# Patient Record
Sex: MALE | Race: WHITE | NOT HISPANIC OR LATINO | Employment: OTHER | URBAN - METROPOLITAN AREA
[De-identification: names, ages, dates, MRNs, and addresses within clinical notes are randomized per-mention and may not be internally consistent; named-entity substitution may affect disease eponyms.]

---

## 2023-01-10 ENCOUNTER — OFFICE VISIT (OUTPATIENT)
Dept: CARDIOLOGY | Facility: CLINIC | Age: 73
End: 2023-01-10
Payer: MEDICARE

## 2023-01-10 VITALS
SYSTOLIC BLOOD PRESSURE: 162 MMHG | HEART RATE: 67 BPM | BODY MASS INDEX: 30.35 KG/M2 | HEIGHT: 70 IN | OXYGEN SATURATION: 97 % | DIASTOLIC BLOOD PRESSURE: 70 MMHG | WEIGHT: 212 LBS

## 2023-01-10 DIAGNOSIS — I48.0 PAF (PAROXYSMAL ATRIAL FIBRILLATION): Primary | ICD-10-CM

## 2023-01-10 PROCEDURE — 99204 OFFICE O/P NEW MOD 45 MIN: CPT | Performed by: STUDENT IN AN ORGANIZED HEALTH CARE EDUCATION/TRAINING PROGRAM

## 2023-01-10 PROCEDURE — 93000 ELECTROCARDIOGRAM COMPLETE: CPT | Performed by: STUDENT IN AN ORGANIZED HEALTH CARE EDUCATION/TRAINING PROGRAM

## 2023-01-10 RX ORDER — ALBUTEROL SULFATE 90 UG/1
1 AEROSOL, METERED RESPIRATORY (INHALATION) AS NEEDED
COMMUNITY
Start: 2022-01-26 | End: 2023-01-26

## 2023-01-10 RX ORDER — METOPROLOL SUCCINATE 25 MG/1
25 TABLET, EXTENDED RELEASE ORAL DAILY
COMMUNITY
Start: 2022-12-27 | End: 2023-02-06

## 2023-01-10 RX ORDER — MONTELUKAST SODIUM 10 MG/1
10 TABLET ORAL DAILY
COMMUNITY
Start: 2023-01-05

## 2023-01-10 RX ORDER — ASPIRIN 81 MG/1
81 TABLET, CHEWABLE ORAL DAILY
COMMUNITY

## 2023-01-10 RX ORDER — TIOTROPIUM BROMIDE INHALATION SPRAY 1.56 UG/1
2 SPRAY, METERED RESPIRATORY (INHALATION) DAILY
COMMUNITY
Start: 2023-01-05

## 2023-01-10 RX ORDER — TAMSULOSIN HYDROCHLORIDE 0.4 MG/1
1 CAPSULE ORAL
COMMUNITY
Start: 2022-10-20

## 2023-01-10 RX ORDER — CETIRIZINE HYDROCHLORIDE 10 MG/1
10 CAPSULE, LIQUID FILLED ORAL DAILY
COMMUNITY

## 2023-01-10 RX ORDER — BUDESONIDE AND FORMOTEROL FUMARATE DIHYDRATE 160; 4.5 UG/1; UG/1
2 AEROSOL RESPIRATORY (INHALATION) 2 TIMES DAILY
COMMUNITY
Start: 2022-10-19

## 2023-01-10 RX ORDER — ACETAMINOPHEN 500 MG
500 TABLET ORAL AS NEEDED
COMMUNITY

## 2023-01-10 RX ORDER — APIXABAN 5 MG/1
5 TABLET, FILM COATED ORAL 2 TIMES DAILY
COMMUNITY
Start: 2022-12-14 | End: 2023-03-29 | Stop reason: SDUPTHER

## 2023-01-10 RX ORDER — MAGNESIUM OXIDE TAB 400 MG (241.3 MG ELEMENTAL MG) 400 (241.3 MG) MG
1 TAB ORAL DAILY
COMMUNITY
Start: 2022-11-18

## 2023-01-10 RX ORDER — LANOLIN ALCOHOL/MO/W.PET/CERES
1000 CREAM (GRAM) TOPICAL DAILY
COMMUNITY

## 2023-01-10 RX ORDER — OMEPRAZOLE 40 MG/1
40 CAPSULE, DELAYED RELEASE ORAL TAKE AS DIRECTED
COMMUNITY
End: 2023-03-02 | Stop reason: HOSPADM

## 2023-01-10 RX ORDER — LORATADINE 10 MG/1
10 CAPSULE, LIQUID FILLED ORAL DAILY
COMMUNITY
End: 2023-03-02 | Stop reason: HOSPADM

## 2023-01-10 RX ORDER — ROSUVASTATIN CALCIUM 40 MG/1
40 TABLET, COATED ORAL DAILY
COMMUNITY
Start: 2022-12-26 | End: 2023-03-23

## 2023-01-10 NOTE — PROGRESS NOTES
[facility] at [facility]  INITIAL OFFICE CONSULT      Albino Rivera  1950  PCP: Linsey Jung MD    Referring MD:Aracelis Prince MD    SUBJECTIVE:   Albino Rivera is a 72 y.o. male seen for a consultation visit regarding the following:     Chief Complaint:   Chief Complaint   Patient presents with   • Paroxysmal Atrial Fibrillation          Consultation is requested by Aracelis Prince MD for evaluation of Paroxysmal Atrial Fibrillation        History:  72-year-old gentleman presents today to the electrophysiology clinic under the request of Dr. Prince for evaluation regarding atrial fibrillation.  He says he had atrial fibrillation dating back at least 2 years.  He is required multiple cardioversions for this.  He was initially placed on sotalol but did have some potential side effects with this.  In retrospect he thinks these were likely unrelated to the sotalol as he has had similar issues after stopping the sotalol.  When he has atrial fibrillation he feels his heart race and overall feels unwell.  This is hard currently happening every few months.  He denies any chest pain, dyspnea, orthopnea, PND, or lower extremity swelling.      Cardiac PMH: (Old records have been reviewed and summarized below)  1. PAF  a. Atrial fibrillation/flutter diagnosed on Holter monitor for 6 days revealing episodes of atrial tachycardia/flutter January 2021  b. Atrial fibrillation with RVR requiring cardioversion procedure June 2022 outside hospital in [place].  c. Symptomatic with palpitations  d. Chads Vascor equal to 3 chronic Eliquis therapy  e. Sotalol therapy ordered per local cardiologist November 2022  2. Coronary disease  a. Angina pectoris abnormal stress echoc small codominant ardiogram with echo revealing apical septal hypokinesis 2021  b. Left heart catheterization and LAD stent, 40% circumflex artery stenosis, with noted  of small codominant RCA February 2021 [facility]  c. Echocardiogram December 30, 2021 EF 45 to 50% mild LVH, left atrium is mildly dilated moderate AI mild MR  d.   3. COPD  4. Hyperlipidemia  5. Abdominal aortic aneurysm: Followed by Dr. Prince      Past Medical History, Past Surgical History, Family history, Social History, and Medications were all reviewed with the patient today and updated as necessary.     No current outpatient medications on file.     No current facility-administered medications for this visit.     Allergies   Allergen Reactions   • Ace Inhibitors Cough         No past medical history on file.  No past surgical history on file.  No family history on file.  Social History     Tobacco Use   • Smoking status: Not on file   • Smokeless tobacco: Not on file   Substance Use Topics   • Alcohol use: Not on file       ROS:  Review of Symptoms:  General: no recent weight loss/gain, weakness or fatigue  Skin: no rashes, lumps, or other skin changes  HEENT: no dizziness, lightheadedness, or vision changes  Respiratory COPD, inhaler use  Cardiovascular: + palpitations, and tachycardia  Gastrointestinal: no black/tarry stools or diarrhea  Urinary: no change in frequency or urgency  Peripheral Vascular: no claudication or leg cramps  Musculoskeletal: no muscle or joint pain/stiffness  Psychiatric: no depression or excessive stress  Neurological: no sensory or motor loss, no syncope  Hematologic: no anemia, easy bruising or bleeding  Endocrine: no thyroid problems, nor heat or cold intolerance         PHYSICAL EXAM:   There were no vitals taken for this visit.     Wt Readings from Last 5 Encounters:   No data found for Wt     BP Readings from Last 5 Encounters:   No data found for BP       General-Well Nourished, Well developed  Eyes - PERRLA  Neck- supple, No mass  CV- regular rate and rhythm, no MRG  Lung- clear bilaterally  Abd- soft, +BS  Musc/skel - Norm strength and range of motion  Skin- warm and dry  Neuro - Alert & Oriented x 3,  appropriate mood.    Patient's external notes were reviewed.  Independent interpretation of test performed by another physician in facility were reviewed.  Outside laboratory data was also reviewed.    Medical problems and test results were reviewed with the patient today.     No results found for this or any previous visit.      Lab Results   Component Value Date    HDL 65 01/27/2022    LDL 70.8 01/27/2022       EKG:  (EKG/Tracing has been independently visualized by me and summarized below)      ECG 12 Lead    Date/Time: 1/10/2023 10:26 AM  Performed by: Manjeet Paniagua MD  Authorized by: Manjeet Paniagua MD   Previous ECG: no previous ECG available  Comments: Sinus rhythm, left axis deviation, nonspecific IVCD, PVCs          Advance Care Planning   ACP discussion was held with the patient during this visit. Patient does not have an advance directive, information provided.         ASSESSMENT/Plan  1. PAF: Recent symptomatic A. fib with RVR, initiation of sotalol therapy per local cardiologist.  Sotalol has since been stopped due to potential side effects.  We discussed today about options going forward.  We discussed the options of either antiarrhythmic agents or catheter ablation.  For the latter we discussed how the procedure performed, the likely of success and potential risks.  He is very interested in proceeding with this.  We will work on getting this scheduled.  2. CAD: Angina abnormal stress test, left heart catheterization with LAD stent noted 40% circumflex artery stenosis and  of small nondominant RCA stenosis EF 45 to 50%.  No anginal symptoms currently, will monitor  3. ICM /valvular heart disease EF 45-50%, mild left atrium dilation mild LVH moderate AI by Echocardiogram December 30, 2021.  Will defer repeat echocardiogram to his primary cardiologist  4. HLD: Statin therapy  5.  [id number] score equal to 4, Eliquis  5 mg twice daily  6.  PVCs he did have PVCs on his EKG today.  I cannot see all 12  leads of this, but it does appear to have a leftward axis as well as discordance in lead II and III.  V1 is negative.  This could represent a PVC from some around the tricuspid annulus.  We may be able to address this during the ablation procedure if these are frequent.         Cardiology/Electrophysiology  01/10/23  09:03 EST

## 2023-01-18 ENCOUNTER — PREP FOR SURGERY (OUTPATIENT)
Dept: OTHER | Facility: HOSPITAL | Age: 73
End: 2023-01-18
Payer: MEDICARE

## 2023-01-18 DIAGNOSIS — I48.0 PAF (PAROXYSMAL ATRIAL FIBRILLATION): Primary | ICD-10-CM

## 2023-01-18 RX ORDER — SODIUM CHLORIDE 9 MG/ML
40 INJECTION, SOLUTION INTRAVENOUS AS NEEDED
Status: CANCELLED | OUTPATIENT
Start: 2023-01-18

## 2023-01-18 RX ORDER — ACETAMINOPHEN 325 MG/1
650 TABLET ORAL EVERY 4 HOURS PRN
Status: CANCELLED | OUTPATIENT
Start: 2023-01-18

## 2023-01-18 RX ORDER — ONDANSETRON 2 MG/ML
4 INJECTION INTRAMUSCULAR; INTRAVENOUS EVERY 6 HOURS PRN
Status: CANCELLED | OUTPATIENT
Start: 2023-01-18

## 2023-01-18 RX ORDER — NITROGLYCERIN 0.4 MG/1
0.4 TABLET SUBLINGUAL
Status: CANCELLED | OUTPATIENT
Start: 2023-01-18

## 2023-02-06 ENCOUNTER — PRE-ADMISSION TESTING (OUTPATIENT)
Dept: PREADMISSION TESTING | Facility: HOSPITAL | Age: 73
End: 2023-02-06
Payer: MEDICARE

## 2023-02-06 ENCOUNTER — HOSPITAL ENCOUNTER (OUTPATIENT)
Dept: CT IMAGING | Facility: HOSPITAL | Age: 73
Discharge: HOME | End: 2023-02-06
Payer: MEDICARE

## 2023-02-06 VITALS — HEIGHT: 70 IN | BODY MASS INDEX: 30.47 KG/M2 | WEIGHT: 212.85 LBS

## 2023-02-06 DIAGNOSIS — I48.0 PAF (PAROXYSMAL ATRIAL FIBRILLATION): ICD-10-CM

## 2023-02-06 LAB
ANION GAP SERPL CALCULATED.3IONS-SCNC: 8 MMOL/L (ref 5–15)
BUN SERPL-MCNC: 15 MG/DL (ref 8–23)
BUN/CREAT SERPL: 16 (ref 7–25)
CALCIUM SPEC-SCNC: 9 MG/DL (ref 8.6–10.5)
CHLORIDE SERPL-SCNC: 103 MMOL/L (ref 98–107)
CO2 SERPL-SCNC: 28 MMOL/L (ref 22–29)
CREAT SERPL-MCNC: 0.94 MG/DL (ref 0.76–1.27)
DEPRECATED RDW RBC AUTO: 44.3 FL (ref 37–54)
EGFRCR SERPLBLD CKD-EPI 2021: 85.6 ML/MIN/1.73
ERYTHROCYTE [DISTWIDTH] IN BLOOD BY AUTOMATED COUNT: 12.9 % (ref 12.3–15.4)
GLUCOSE SERPL-MCNC: 107 MG/DL (ref 65–99)
HCT VFR BLD AUTO: 43.9 % (ref 37.5–51)
HGB BLD-MCNC: 14.9 G/DL (ref 13–17.7)
MAGNESIUM SERPL-MCNC: 1.8 MG/DL (ref 1.6–2.4)
MCH RBC QN AUTO: 31.7 PG (ref 26.6–33)
MCHC RBC AUTO-ENTMCNC: 33.9 G/DL (ref 31.5–35.7)
MCV RBC AUTO: 93.4 FL (ref 79–97)
PLATELET # BLD AUTO: 219 10*3/MM3 (ref 140–450)
PMV BLD AUTO: 10 FL (ref 6–12)
POTASSIUM SERPL-SCNC: 4.7 MMOL/L (ref 3.5–5.2)
RBC # BLD AUTO: 4.7 10*6/MM3 (ref 4.14–5.8)
SODIUM SERPL-SCNC: 139 MMOL/L (ref 136–145)
WBC NRBC COR # BLD: 6.08 10*3/MM3 (ref 3.4–10.8)

## 2023-02-06 PROCEDURE — 71275 CT ANGIOGRAPHY CHEST: CPT

## 2023-02-06 PROCEDURE — 85027 COMPLETE CBC AUTOMATED: CPT

## 2023-02-06 PROCEDURE — 83735 ASSAY OF MAGNESIUM: CPT

## 2023-02-06 PROCEDURE — 80048 BASIC METABOLIC PNL TOTAL CA: CPT

## 2023-02-06 PROCEDURE — 0 IOPAMIDOL PER 1 ML: Performed by: STUDENT IN AN ORGANIZED HEALTH CARE EDUCATION/TRAINING PROGRAM

## 2023-02-06 PROCEDURE — 36415 COLL VENOUS BLD VENIPUNCTURE: CPT

## 2023-02-06 RX ORDER — METOPROLOL TARTRATE 50 MG/1
50 TABLET, FILM COATED ORAL 2 TIMES DAILY
COMMUNITY
End: 2023-03-02 | Stop reason: HOSPADM

## 2023-02-06 RX ADMIN — IOPAMIDOL 80 ML: 755 INJECTION, SOLUTION INTRAVENOUS at 13:55

## 2023-02-06 NOTE — DISCHARGE INSTRUCTIONS
"Dear Patient,    Do NOT eat, drink, or smoke after midnight the night before your procedure.   Take your medications as instructed by your doctor.    Glasses and jewelry may be worn, but dentures must be removed prior to your procedure.    Leave any items you consider valuable at home.      MORNING of your Procedure, please bring the following:     -Photo ID and insurance card(s)    -ALL medications in their ORIGINAL CONTAINERS    -Co-pay and/or deductible required by your insurance   -Copy of living will or power of  document (if not brought to    Pre-Admission Testing department)   -CPAP mask and tubing, not your machine (if applicable)    -Relaxation aids (music, books, magazines)   -Skin Prep Instruction Sheet (if applicable)   -Relaxation Aids    Check in on the 2nd floor in the [facility].  Your procedure will be performed in the cath lab or EP lab.  During your procedure, your family will wait in the cath lab waiting area where you checked in.      Need to make arrangements for transportation prior to discharge.    A handout regarding \"Heart Healthy Eating\" was provided today to encourage healthy eating habits.    Booklet published by [employer] was given in Pre-Admission testing.  This booklet is for informational purposes only.  If you have any questions about your procedure, please speak with your physician.      Please note:  If you are scheduled to have one of the following procedures: Pulmonary Vein Ablation, Lead Extraction, MitraClip, Cerebral Coilings or Embolization, please let your family know that after your procedure you will be going to recovery unit on the 2nd floor of the [facility].  When the physician is finished speaking with your family after your procedure is completed, your family will be directed or escorted to the surgery waiting area in the [facility].  This is where your family will wait until you are given a room assignment and then your family will be directed to the " appropriate unit.

## 2023-02-09 NOTE — NURSING NOTE
PRE-PVA ASSESSMENT  Albino Rivera 1950   [address] [address]   [phone]    Referral Source: Aracelis Prince MD  Information obtained from: [x] Medical record review  [x] Patient report  Scheduled for: PVA on 2/13/23 with Dr. Paniagua  Allergies   Allergen Reactions   • Ace Inhibitors Cough       AFib Specific History:  AFIBTYPE2: paroxysmal    CHADS-VASc Risk Assessment            2 Total Score    1 Hypertension    1 Age 65-74        Criteria that do not apply:    CHF    Age >/= 75    DM    PRIOR STROKE/TIA/THROMBO    Vascular Disease    Sex: Female        Anticoagulation: Eliquis 5 mg bid NO MISSED DOSES   Cardioversion x multiple  Failed AAD(s): sotalol  Prior Ablation: No    Is Mr. Rivera aware of his AFib? Yes   Onset: 2021   Exacerbations: none   Frequency: daily- weekly  Alleviations: none     Duration: up to 4 hours      Symptoms:   [x] Palpitations:    [] Chest Discomfort:    [] Dizziness:    [] Presyncope:    [] Lightheadedness:   [] Syncope:    [x] Fatigue:    [] Other:    [x] Short of Breath:     Last Echo(s):  [x] Echocardiogram December 30, 2021 EF 45 to 50% mild LVH, left atrium is mildly dilated moderate AI mild MR       Past medical History:   [] Diabetes   No               Hemoglobin A1C   Date Value Ref Range Status   01/30/2023 5.4 <5.7 % Final   01/27/2022 5.6 <5.7 % Final        [] HYPOthyroidism No  [] HYPERthyroidism No       No results found for: TSH    [x] HTN        [] Tx metoprolol         [] Controlled SBP ~ 135    [] Heart Failure  No  [] CVA   No                            [] TIA No        [] Ischemic         [] Hemorrhagic         [] Nonischemic         [] Embolic        [] Diastolic    [x] CAD         [] MI No           [x] Dyslipidemia  [x] Statin indicated on crestor    [x] Ischemic Evaluation  Angina pectoris abnormal stress echoc small codominant ardiogram with echo revealing apical septal hypokinesis 2021    Left heart catheterization and LAD stent, 40%  circumflex artery stenosis, with noted  of small codominant RCA February 2021 [facility]    [x] Sleep Apnea Diagnosed - just diagnosed with mod.  Appointment to follow on 3/8    [x] Obesity       [x] BMI 30.42        [x] Weight reduction discussed with Mr. Rivera         [] Nutrition Consult            [x] Declined by Mr. Rivera     [] Depression  No   [] Anxiety No                  [] Urologic History H/O kidney stone        [] Urologic cancer surgery         [] Severe decrease in flow of urine stream        [] Recent unexplained gross hematuria       [] Hx urethral stricture     Other Pertinent PMH: COPD, Abdominal aortic aneurysm: Followed by Dr. Prince    Social / Lifestyle History:   []   Tobacco:Denies    [x]   Alcohol:1 drink the week     [x]   Caffeine: 2 cups per day   []   Recreational Drugs: None    []   Stress Issues: No    [x]   Exercise: walking    Summary of Patient Contact:    I spoke with Mr. Rivera about his upcoming PVA.   He was well informed about the procedure from prior discussion with Dr. Paniagua and from reading the provided literature.  We discussed the procedure at length including risks, anesthesia, intra-op procedures, recovery, bedrest, sheath removal, discharge criteria, normal post-procedure expectations, and success rates.  I answered a few remaining questions. Mr. Rivera verbalized understanding and he is ready to proceed.      Brittany Hector RN

## 2023-02-13 ENCOUNTER — HOSPITAL ENCOUNTER (OUTPATIENT)
Facility: HOSPITAL | Age: 73
Setting detail: HOSPITAL OUTPATIENT SURGERY
Discharge: HOME | End: 2023-02-13
Attending: STUDENT IN AN ORGANIZED HEALTH CARE EDUCATION/TRAINING PROGRAM | Admitting: STUDENT IN AN ORGANIZED HEALTH CARE EDUCATION/TRAINING PROGRAM
Payer: MEDICARE

## 2023-02-13 ENCOUNTER — ANESTHESIA (OUTPATIENT)
Dept: CARDIOLOGY | Facility: HOSPITAL | Age: 73
End: 2023-02-13
Payer: MEDICARE

## 2023-02-13 ENCOUNTER — ANESTHESIA EVENT (OUTPATIENT)
Dept: CARDIOLOGY | Facility: HOSPITAL | Age: 73
End: 2023-02-13
Payer: MEDICARE

## 2023-02-13 VITALS
RESPIRATION RATE: 20 BRPM | WEIGHT: 211.2 LBS | SYSTOLIC BLOOD PRESSURE: 143 MMHG | HEIGHT: 70 IN | TEMPERATURE: 98.6 F | DIASTOLIC BLOOD PRESSURE: 78 MMHG | BODY MASS INDEX: 30.24 KG/M2 | HEART RATE: 69 BPM | OXYGEN SATURATION: 96 %

## 2023-02-13 DIAGNOSIS — I48.0 PAF (PAROXYSMAL ATRIAL FIBRILLATION): ICD-10-CM

## 2023-02-13 LAB
ACT BLD: 305 SECONDS (ref 82–152)
ACT BLD: 317 SECONDS (ref 82–152)
ACT BLD: 329 SECONDS (ref 82–152)
ACT BLD: 335 SECONDS (ref 82–152)
ACT BLD: 347 SECONDS (ref 82–152)

## 2023-02-13 PROCEDURE — 0 IOPAMIDOL PER 1 ML: Performed by: STUDENT IN AN ORGANIZED HEALTH CARE EDUCATION/TRAINING PROGRAM

## 2023-02-13 PROCEDURE — C1894 INTRO/SHEATH, NON-LASER: HCPCS | Performed by: STUDENT IN AN ORGANIZED HEALTH CARE EDUCATION/TRAINING PROGRAM

## 2023-02-13 PROCEDURE — C1759 CATH, INTRA ECHOCARDIOGRAPHY: HCPCS | Performed by: STUDENT IN AN ORGANIZED HEALTH CARE EDUCATION/TRAINING PROGRAM

## 2023-02-13 PROCEDURE — 93657 TX L/R ATRIAL FIB ADDL: CPT | Performed by: STUDENT IN AN ORGANIZED HEALTH CARE EDUCATION/TRAINING PROGRAM

## 2023-02-13 PROCEDURE — 25010000002 PROTAMINE SULFATE PER 10 MG: Performed by: STUDENT IN AN ORGANIZED HEALTH CARE EDUCATION/TRAINING PROGRAM

## 2023-02-13 PROCEDURE — C1732 CATH, EP, DIAG/ABL, 3D/VECT: HCPCS | Performed by: STUDENT IN AN ORGANIZED HEALTH CARE EDUCATION/TRAINING PROGRAM

## 2023-02-13 PROCEDURE — 25010000002 ONDANSETRON PER 1 MG: Performed by: NURSE ANESTHETIST, CERTIFIED REGISTERED

## 2023-02-13 PROCEDURE — 93623 PRGRMD STIMJ&PACG IV RX NFS: CPT | Performed by: STUDENT IN AN ORGANIZED HEALTH CARE EDUCATION/TRAINING PROGRAM

## 2023-02-13 PROCEDURE — C1893 INTRO/SHEATH, FIXED,NON-PEEL: HCPCS | Performed by: STUDENT IN AN ORGANIZED HEALTH CARE EDUCATION/TRAINING PROGRAM

## 2023-02-13 PROCEDURE — 85347 COAGULATION TIME ACTIVATED: CPT

## 2023-02-13 PROCEDURE — C1769 GUIDE WIRE: HCPCS | Performed by: STUDENT IN AN ORGANIZED HEALTH CARE EDUCATION/TRAINING PROGRAM

## 2023-02-13 PROCEDURE — 25010000002 FENTANYL CITRATE (PF) 50 MCG/ML SOLUTION: Performed by: NURSE ANESTHETIST, CERTIFIED REGISTERED

## 2023-02-13 PROCEDURE — 93656 COMPRE EP EVAL ABLTJ ATR FIB: CPT | Performed by: STUDENT IN AN ORGANIZED HEALTH CARE EDUCATION/TRAINING PROGRAM

## 2023-02-13 PROCEDURE — 25010000002 PHENYLEPHRINE 10 MG/ML SOLUTION 1 ML VIAL: Performed by: NURSE ANESTHETIST, CERTIFIED REGISTERED

## 2023-02-13 PROCEDURE — 25010000002 HEPARIN (PORCINE) PER 1000 UNITS: Performed by: STUDENT IN AN ORGANIZED HEALTH CARE EDUCATION/TRAINING PROGRAM

## 2023-02-13 PROCEDURE — 25010000002 PROPOFOL 10 MG/ML EMULSION: Performed by: NURSE ANESTHETIST, CERTIFIED REGISTERED

## 2023-02-13 PROCEDURE — 25010000002 DEXAMETHASONE PER 1 MG: Performed by: NURSE ANESTHETIST, CERTIFIED REGISTERED

## 2023-02-13 PROCEDURE — C1760 CLOSURE DEV, VASC: HCPCS | Performed by: STUDENT IN AN ORGANIZED HEALTH CARE EDUCATION/TRAINING PROGRAM

## 2023-02-13 PROCEDURE — 93655 ICAR CATH ABLTJ DSCRT ARRHYT: CPT | Performed by: STUDENT IN AN ORGANIZED HEALTH CARE EDUCATION/TRAINING PROGRAM

## 2023-02-13 PROCEDURE — C1766 INTRO/SHEATH,STRBLE,NON-PEEL: HCPCS | Performed by: STUDENT IN AN ORGANIZED HEALTH CARE EDUCATION/TRAINING PROGRAM

## 2023-02-13 PROCEDURE — C1730 CATH, EP, 19 OR FEW ELECT: HCPCS | Performed by: STUDENT IN AN ORGANIZED HEALTH CARE EDUCATION/TRAINING PROGRAM

## 2023-02-13 RX ORDER — PROPOFOL 10 MG/ML
VIAL (ML) INTRAVENOUS AS NEEDED
Status: DISCONTINUED | OUTPATIENT
Start: 2023-02-13 | End: 2023-02-13 | Stop reason: SURG

## 2023-02-13 RX ORDER — ONDANSETRON 2 MG/ML
4 INJECTION INTRAMUSCULAR; INTRAVENOUS ONCE AS NEEDED
Status: DISCONTINUED | OUTPATIENT
Start: 2023-02-13 | End: 2023-02-13 | Stop reason: HOSPADM

## 2023-02-13 RX ORDER — ACETAMINOPHEN 325 MG/1
650 TABLET ORAL EVERY 4 HOURS PRN
Status: DISCONTINUED | OUTPATIENT
Start: 2023-02-13 | End: 2023-02-13 | Stop reason: HOSPADM

## 2023-02-13 RX ORDER — HEPARIN SODIUM 10000 [USP'U]/100ML
INJECTION, SOLUTION INTRAVENOUS
Status: DISCONTINUED | OUTPATIENT
Start: 2023-02-13 | End: 2023-02-13 | Stop reason: HOSPADM

## 2023-02-13 RX ORDER — SODIUM CHLORIDE, SODIUM LACTATE, POTASSIUM CHLORIDE, CALCIUM CHLORIDE 600; 310; 30; 20 MG/100ML; MG/100ML; MG/100ML; MG/100ML
INJECTION, SOLUTION INTRAVENOUS CONTINUOUS PRN
Status: DISCONTINUED | OUTPATIENT
Start: 2023-02-13 | End: 2023-02-13 | Stop reason: SURG

## 2023-02-13 RX ORDER — ONDANSETRON 2 MG/ML
INJECTION INTRAMUSCULAR; INTRAVENOUS AS NEEDED
Status: DISCONTINUED | OUTPATIENT
Start: 2023-02-13 | End: 2023-02-13 | Stop reason: SURG

## 2023-02-13 RX ORDER — PROTAMINE SULFATE 10 MG/ML
INJECTION, SOLUTION INTRAVENOUS
Status: DISCONTINUED | OUTPATIENT
Start: 2023-02-13 | End: 2023-02-13 | Stop reason: HOSPADM

## 2023-02-13 RX ORDER — SODIUM CHLORIDE 9 MG/ML
40 INJECTION, SOLUTION INTRAVENOUS AS NEEDED
Status: DISCONTINUED | OUTPATIENT
Start: 2023-02-13 | End: 2023-02-13 | Stop reason: HOSPADM

## 2023-02-13 RX ORDER — LIDOCAINE HYDROCHLORIDE 10 MG/ML
INJECTION, SOLUTION EPIDURAL; INFILTRATION; INTRACAUDAL; PERINEURAL AS NEEDED
Status: DISCONTINUED | OUTPATIENT
Start: 2023-02-13 | End: 2023-02-13 | Stop reason: SURG

## 2023-02-13 RX ORDER — FENTANYL CITRATE 50 UG/ML
50 INJECTION, SOLUTION INTRAMUSCULAR; INTRAVENOUS
Status: DISCONTINUED | OUTPATIENT
Start: 2023-02-13 | End: 2023-02-13 | Stop reason: HOSPADM

## 2023-02-13 RX ORDER — FENTANYL CITRATE 50 UG/ML
INJECTION, SOLUTION INTRAMUSCULAR; INTRAVENOUS AS NEEDED
Status: DISCONTINUED | OUTPATIENT
Start: 2023-02-13 | End: 2023-02-13 | Stop reason: SURG

## 2023-02-13 RX ORDER — SODIUM CHLORIDE 9 MG/ML
INJECTION, SOLUTION INTRAVENOUS
Status: DISCONTINUED | OUTPATIENT
Start: 2023-02-13 | End: 2023-02-13 | Stop reason: HOSPADM

## 2023-02-13 RX ORDER — SODIUM CHLORIDE 0.9 % (FLUSH) 0.9 %
3 SYRINGE (ML) INJECTION EVERY 12 HOURS SCHEDULED
Status: DISCONTINUED | OUTPATIENT
Start: 2023-02-13 | End: 2023-02-13 | Stop reason: HOSPADM

## 2023-02-13 RX ORDER — DEXAMETHASONE SODIUM PHOSPHATE 10 MG/ML
INJECTION INTRAMUSCULAR; INTRAVENOUS AS NEEDED
Status: DISCONTINUED | OUTPATIENT
Start: 2023-02-13 | End: 2023-02-13 | Stop reason: SURG

## 2023-02-13 RX ORDER — NITROGLYCERIN 0.4 MG/1
0.4 TABLET SUBLINGUAL
Status: DISCONTINUED | OUTPATIENT
Start: 2023-02-13 | End: 2023-02-13 | Stop reason: HOSPADM

## 2023-02-13 RX ORDER — SODIUM CHLORIDE 0.9 % (FLUSH) 0.9 %
10 SYRINGE (ML) INJECTION AS NEEDED
Status: DISCONTINUED | OUTPATIENT
Start: 2023-02-13 | End: 2023-02-13 | Stop reason: HOSPADM

## 2023-02-13 RX ORDER — ACETAMINOPHEN 650 MG/1
650 SUPPOSITORY RECTAL EVERY 4 HOURS PRN
Status: DISCONTINUED | OUTPATIENT
Start: 2023-02-13 | End: 2023-02-13 | Stop reason: HOSPADM

## 2023-02-13 RX ORDER — IBUPROFEN 200 MG
400 TABLET ORAL EVERY 6 HOURS PRN
Status: DISCONTINUED | OUTPATIENT
Start: 2023-02-13 | End: 2023-02-13 | Stop reason: HOSPADM

## 2023-02-13 RX ORDER — HEPARIN SODIUM 1000 [USP'U]/ML
INJECTION, SOLUTION INTRAVENOUS; SUBCUTANEOUS
Status: DISCONTINUED | OUTPATIENT
Start: 2023-02-13 | End: 2023-02-13 | Stop reason: HOSPADM

## 2023-02-13 RX ORDER — ONDANSETRON 2 MG/ML
4 INJECTION INTRAMUSCULAR; INTRAVENOUS EVERY 6 HOURS PRN
Status: DISCONTINUED | OUTPATIENT
Start: 2023-02-13 | End: 2023-02-13 | Stop reason: HOSPADM

## 2023-02-13 RX ORDER — ROCURONIUM BROMIDE 10 MG/ML
INJECTION, SOLUTION INTRAVENOUS AS NEEDED
Status: DISCONTINUED | OUTPATIENT
Start: 2023-02-13 | End: 2023-02-13 | Stop reason: SURG

## 2023-02-13 RX ADMIN — PHENYLEPHRINE HYDROCHLORIDE 0.2 MCG/KG/MIN: 10 INJECTION INTRAVENOUS at 08:57

## 2023-02-13 RX ADMIN — ROCURONIUM BROMIDE 20 MG: 10 INJECTION, SOLUTION INTRAVENOUS at 09:54

## 2023-02-13 RX ADMIN — ROCURONIUM BROMIDE 50 MG: 10 INJECTION, SOLUTION INTRAVENOUS at 08:40

## 2023-02-13 RX ADMIN — ONDANSETRON 4 MG: 2 INJECTION INTRAMUSCULAR; INTRAVENOUS at 12:00

## 2023-02-13 RX ADMIN — SUGAMMADEX 250 MG: 100 INJECTION, SOLUTION INTRAVENOUS at 12:18

## 2023-02-13 RX ADMIN — SODIUM CHLORIDE, POTASSIUM CHLORIDE, SODIUM LACTATE AND CALCIUM CHLORIDE: 600; 310; 30; 20 INJECTION, SOLUTION INTRAVENOUS at 08:30

## 2023-02-13 RX ADMIN — DEXAMETHASONE SODIUM PHOSPHATE 4 MG: 10 INJECTION INTRAMUSCULAR; INTRAVENOUS at 08:49

## 2023-02-13 RX ADMIN — FENTANYL CITRATE 50 MCG: 50 INJECTION, SOLUTION INTRAMUSCULAR; INTRAVENOUS at 08:40

## 2023-02-13 RX ADMIN — PROPOFOL 230 MG: 10 INJECTION, EMULSION INTRAVENOUS at 08:40

## 2023-02-13 RX ADMIN — LIDOCAINE HYDROCHLORIDE 50 MG: 10 INJECTION, SOLUTION EPIDURAL; INFILTRATION; INTRACAUDAL; PERINEURAL at 08:40

## 2023-02-13 RX ADMIN — ROCURONIUM BROMIDE 20 MG: 10 INJECTION, SOLUTION INTRAVENOUS at 10:11

## 2023-02-13 RX ADMIN — ROCURONIUM BROMIDE 10 MG: 10 INJECTION, SOLUTION INTRAVENOUS at 10:26

## 2023-02-13 NOTE — H&P
Cardiology Consult/H&P     Albino Rivera  1950  [phone]  There is no work phone number on file.    02/13/23    DATE OF ADMISSION: 2/13/2023  [facility] EP LAB    Linsey Jung MD  [address] [facility] [address]  Referring Provider: Manjeet Paniagua MD     CC: afib    Problem list:   1. PAF  a. Atrial fibrillation/flutter diagnosed on Holter monitor for 6 days revealing episodes of atrial tachycardia/flutter January 2021  b. Atrial fibrillation with RVR requiring cardioversion procedure June 2022 outside hospital in [place].  c. Symptomatic with palpitations  d. Chads Vascor equal to 3 chronic Eliquis therapy  e. Sotalol therapy ordered per local cardiologist November 2022  2. Coronary disease  a. Angina pectoris abnormal stress echo small codominant ardiogram with echo revealing apical septal hypokinesis 2021  b. Left heart catheterization and LAD stent, 40% circumflex artery stenosis, with noted  of small codominant RCA February 2021 [facility]  c. Echocardiogram December 30, 2021 EF 45 to 50% mild LVH, left atrium is mildly dilated moderate AI mild MR  3. COPD  4. Hyperlipidemia  5. Abdominal aortic aneurysm: Followed by Dr. Prince    History of Present Illness:   Albino Rivera is a 72 year old male with above PMHx presenting for scheduled PVA today. History of afib RVR requiring ECV in June 2022. Tried Sotalol for rhythm control but unable to tolerate. He is symptomatic with palpitations. He has been compliant with Eliquis and last dose was last night.     Allergies   Allergen Reactions   • Ace Inhibitors Cough       Prior to Admission Medications     Prescriptions Last Dose Informant Patient Reported? Taking?    acetaminophen (TYLENOL) 500 MG tablet  Medication Bottle Yes Yes    Take 500 mg by mouth As Needed for Mild Pain.    ALBUTEROL SULFATE HFA IN  Medication Bottle Yes Yes    Inhale As Needed (SOB/wheezing).    aspirin 81 MG chewable tablet 2/13/2023  Medication Bottle Yes Yes    Chew 81 mg Daily.    Cetirizine HCl (ZyrTEC Allergy) 10 MG capsule  Medication Bottle Yes Yes    Take 10 mg by mouth Daily.    Eliquis 5 MG tablet tablet 2/12/2023 Medication Bottle Yes Yes    Take 5 mg by mouth 2 (Two) Times a Day. Per Dr. Paniagua's instructions, pt is to take the last dose the night prior to procedure    Loratadine 10 MG capsule  Medication Bottle Yes Yes    Take 10 mg by mouth Daily.    MAGnesium-Oxide 400 (240 Mg) MG tablet  Medication Bottle Yes Yes    Take 1 tablet by mouth Daily.    metoprolol tartrate (LOPRESSOR) 50 MG tablet 2/13/2023 Medication Bottle Yes Yes    Take 50 mg by mouth 2 (Two) Times a Day.    montelukast (SINGULAIR) 10 MG tablet  Medication Bottle Yes Yes    Take 10 mg by mouth Daily.    omeprazole (priLOSEC) 40 MG capsule  Medication Bottle Yes Yes    Take 40 mg by mouth Take As Directed. M/W/F    rosuvastatin (CRESTOR) 40 MG tablet  Medication Bottle Yes Yes    Take 40 mg by mouth Daily.    Symbicort 160-4.5 MCG/ACT inhaler  Medication Bottle Yes Yes    Inhale 2 puffs 2 (Two) Times a Day. Rinse mouth after each use    tamsulosin (FLOMAX) 0.4 MG capsule 24 hr capsule  Medication Bottle Yes Yes    Take 1 capsule by mouth every night at bedtime.    Tiotropium Bromide Monohydrate (Spiriva Respimat) 1.25 MCG/ACT aerosol solution inhaler  Medication Bottle Yes Yes    Inhale 2 puffs Daily.    vitamin B-12 (CYANOCOBALAMIN) 1000 MCG tablet  Medication Bottle Yes Yes    Take 1,000 mcg by mouth Daily.            Current Facility-Administered Medications:   •  acetaminophen (TYLENOL) tablet 650 mg, 650 mg, Oral, Q4H PRN, Jung Mascorro PA  •  heparin (porcine) injection, , , Code / Trauma / Sedation Medication, Manjeet Paniagua MD, 3,000 Units at 02/13/23 0952  •  heparin 70330 units/250 mL (100 units/mL) in 0.45 % NaCl infusion, , , Code / Trauma / Sedation Continuous Med, Manjeet Paniagua MD, Last Rate: 15 mL/hr at 02/13/23 0911, 1,500 Units/hr at 02/13/23  0911  •  nitroglycerin (NITROSTAT) SL tablet 0.4 mg, 0.4 mg, Sublingual, Q5 Min PRN, Jung Mascorro PA  •  ondansetron (ZOFRAN) injection 4 mg, 4 mg, Intravenous, Q6H PRN, Jung Mascorro PA  •  sodium chloride 0.9 % infusion 40 mL, 40 mL, Intravenous, PRN, Jung Mascorro PA  •  sodium chloride 0.9 % infusion, , , Code / Trauma / Sedation Continuous Med, Manjeet Paniagua MD, 1,000 mL at 23 0852    Facility-Administered Medications Ordered in Other Encounters:   •  dexamethasone (DECADRON) injection, , Intravenous, PRN, Jose, Wayne R, CRNA, 4 mg at 23 0849  •  fentaNYL citrate (PF) (SUBLIMAZE) injection, , Intravenous, PRN, Jose, Wayne R, CRNA, 50 mcg at 23 0840  •  lactated ringers infusion, , Intravenous, Continuous PRN, Jose, Wayne R, CRNA, New Bag at 23 0830  •  lidocaine PF 1% (XYLOCAINE) injection, , Intravenous, PRN, Jose, Wayne R, CRNA, 50 mg at 23 0840  •  phenylephrine (DEVIN-SYNEPHRINE) 50 mg in sodium chloride 0.9 % 250 mL infusion, , Intravenous, Continuous PRN, Jose, Wayne R, CRNA, Last Rate: 5.748 mL/hr at 23 0857, 0.2 mcg/kg/min at 23 0857  •  Propofol (DIPRIVAN) injection, , Intravenous, PRN, Jose, Wayne R, CRNA, 230 mg at 23 0840  •  rocuronium (ZEMURON) injection, , Intravenous, PRN, Jose, Wayne R, CRNA, 50 mg at 23 0840    Social History     Socioeconomic History   • Marital status:    • Number of children: 2   Tobacco Use   • Smoking status: Former     Packs/day: 2.00     Years: 17.00     Pack years: 34.00     Types: Cigarettes     Quit date:      Years since quittin.1   • Smokeless tobacco: Never   Vaping Use   • Vaping Use: Never used   Substance and Sexual Activity   • Alcohol use: Not Currently     Comment: a few drink a week   • Drug use: Never   • Sexual activity: Defer       Family History   Problem Relation Age of Onset   • Heart failure Father    • Heart attack Brother    • Prostate cancer  "Brother    • Melanoma Brother    • Atrial fibrillation Brother        REVIEW OF SYSTEMS:   CONSTITUTIONAL:         No weight loss, fever, chills, weakness or fatigue.   HEENT:                            No visual loss, blurred vision, double vision, yellow sclerae.                                             No hearing loss, congestion, sore throat.   SKIN:                                No rashes, urticaria, ulcers, sores.     RESPIRATORY:               No shortness of breath, hemoptysis, cough, sputum.   GI:                                     No anorexia, nausea, vomiting, diarrhea. No abdominal pain, melena.   :                                   No burning on urination, hematuria or increased frequency.  ENDOCRINE:                   No diaphoresis, cold or heat intolerance. No polyuria or polydipsia.   NEURO:                            No headache, dizziness, syncope, paralysis, ataxia, or parasthesias.                                            No change in bowel or bladder control. No history of CVA/TIA  MUSCULOSKELETAL:    No muscle, back pain, joint pain or stiffness.   HEMATOLOGY:               No anemia, bleeding, bruising. No history of DVT/PE.  PSYCH:                            No history of depression, anxiety    Vitals:    02/13/23 0629 02/13/23 0644 02/13/23 0646   BP:  174/79 171/96   BP Location:  Right arm Left arm   Patient Position:  Sitting Sitting   Pulse:  58 61   Resp:  16    Temp:  97.1 °F (36.2 °C)    TempSrc:  Temporal    SpO2:  96%    Weight: 95.8 kg (211 lb 3.2 oz)     Height: 177.8 cm (70\")           Vital Sign Min/Max for last 24 hours  Temp  Min: 97.1 °F (36.2 °C)  Max: 97.1 °F (36.2 °C)   BP  Min: 171/96  Max: 174/79   Pulse  Min: 58  Max: 61   Resp  Min: 16  Max: 16   SpO2  Min: 96 %  Max: 96 %   No data recorded    No intake or output data in the 24 hours ending 02/13/23 1006          Physical Exam:  GEN: Well nourished, Well- developed  No acute distress  HEENT: Normocephalic, " Atraumatic, PERRLA, moist mucous membranes  NECK: supple, NO JVD, no thyromegaly, no lymphadenopathy  CARDIAC: S1S2  RRR no murmur, gallop, rub  LUNGS: Clear to ausculation, normal respiratory effort  ABDOMEN: Soft, nontender, normal bowel sounds  EXTREMITIES:No gross deformities,  No clubbing, cyanosis, or edema  SKIN: Warm, dry  NEURO: No focal deficits  PSYCHIATRIC: Normal affect and mood      I personally viewed and interpreted the patient's EKG/Telemetry/lab data    Data:   Results from last 7 days   Lab Units 02/06/23  1247   WBC 10*3/mm3 6.08   HEMOGLOBIN g/dL 14.9   HEMATOCRIT % 43.9   PLATELETS 10*3/mm3 219     Results from last 7 days   Lab Units 02/06/23  1247   SODIUM mmol/L 139   POTASSIUM mmol/L 4.7   CHLORIDE mmol/L 103   CO2 mmol/L 28.0   BUN mg/dL 15   CREATININE mg/dL 0.94   GLUCOSE mg/dL 107*                                  No intake or output data in the 24 hours ending 02/13/23 1006      Assessment and Plan:   1. PAF:   -Recent symptomatic A. fib with RVR, initiation of sotalol therapy per local cardiologist.  Sotalol has since been stopped due to potential side effects.    -He would benefit from EPS +/- PVA. The risks, benefits, and alternatives of the procedure have been reviewed and the patient wishes to proceed.   -[id number] score equal to 4, Eliquis  5 mg twice daily    2. CAD:   -Angina abnormal stress test, left heart catheterization with LAD stent noted 40% circumflex artery stenosis and  of small nondominant RCA stenosis EF 45 to 50%.  No anginal symptoms currently, will monitor    3. ICM /valvular heart disease   -EF 45-50%, mild left atrium dilation mild LVH moderate AI by Echocardiogram December 30, 2021.  Will defer repeat echocardiogram to his primary cardiologist    4. PVCs  -may be able to address during EPS as well if frequent during study    Electronically signed by VIV Cosby, 02/13/23, 10:11 AM EST.

## 2023-02-13 NOTE — ANESTHESIA PREPROCEDURE EVALUATION
Anesthesia Evaluation     Patient summary reviewed and Nursing notes reviewed   no history of anesthetic complications:  NPO Solid Status: > 8 hours  NPO Liquid Status: > 2 hours           Airway   Mallampati: II  TM distance: >3 FB  Neck ROM: full  Small opening and Possible difficult intubation  Dental - normal exam     Pulmonary - normal exam   (+) a smoker Former, COPD (occasional albuterol use r/t allergy sx), sleep apnea,   (-) no home oxygen  Cardiovascular - normal exam  Exercise tolerance: good (4-7 METS)    ECG reviewed  PT is on anticoagulation therapy  Patient on routine beta blocker and Beta blocker given within 24 hours of surgery    (+) hypertension, CAD, dysrhythmias Atrial Fib, hyperlipidemia,   (-) angina    ROS comment: a. Left heart catheterization and LAD stent, 40% circumflex artery stenosis, with noted  of small codominant RCA February 2021 [facility]  b. Echocardiogram December 30, 2021 EF 45 to 50% mild LVH, left atrium is mildly dilated moderate AI mild MR    Neuro/Psych  (-) seizures, CVA  GI/Hepatic/Renal/Endo    (+) obesity,  GERD well controlled,  renal disease stones,     Musculoskeletal     Abdominal    Substance History - negative use     OB/GYN          Other        ROS/Med Hx Other: hgb 14.9 k 4.7  eliquis  3/22-Aorta measures up to 3.5 cm                 Anesthesia Plan    ASA 3     general     (Risks and benefits of general anesthesia discussed with patient (including MI, CVA, death, recall, aspiration, oropharyngeal/dental damage), questions answered, agreeable to proceed.    Clearsite HD monitoring)  intravenous induction     Anesthetic plan, risks, benefits, and alternatives have been provided, discussed and informed consent has been obtained with: patient and spouse/significant other.    Use of blood products discussed with patient and spouse/significant other  Consented to blood products.   Plan discussed with CRNA.        CODE STATUS:

## 2023-02-14 ENCOUNTER — CALL CENTER PROGRAMS (OUTPATIENT)
Dept: CALL CENTER | Facility: HOSPITAL | Age: 73
End: 2023-02-14
Payer: MEDICARE

## 2023-02-14 NOTE — OUTREACH NOTE
PCI/Device Survey    Flowsheet Row Responses   Facility patient discharged from? [facility]   Procedure date 02/13/23   Procedure (if device, specify in description) Ablation   Performing MD Dr. Manjeet Paniagua   Attempt successful? Yes   Call start time [phone]   Call end time [phone]   Person spoke with today (if not patient) and relationship spouse   Has the patient had any of the following symptoms since discharge? --  [Denies any symptoms]   Is the patient taking prescribed medications: ASA  [Eliquis]   Does the patient have any of the following symptoms related to the cath/surgical site? --  [Dressings remain clean and intact to groins]   Does the patient have an appointment scheduled with the cardiologist? Yes   Appointment comments Dr Prince 3/2, Nwe patient with Dasha NAM 3/13, Dr Paniagua 3/16   If the patient is a current smoker, are they able to teach back resources for cessation? Not a smoker   Did the patient feel prepared to go home on the same day as the procedure? Yes   Is the patient satisfied with the same day discharge process? Yes   PCI/Device call completed Yes   Wrap up additional comments Wife reports patient did well overnight.          JEAN STOKES - Registered Nurse

## 2023-03-02 ENCOUNTER — OFFICE VISIT (OUTPATIENT)
Dept: CARDIOLOGY | Facility: CLINIC | Age: 73
End: 2023-03-02
Payer: MEDICARE

## 2023-03-02 VITALS
WEIGHT: 212 LBS | HEIGHT: 70 IN | OXYGEN SATURATION: 99 % | BODY MASS INDEX: 30.35 KG/M2 | SYSTOLIC BLOOD PRESSURE: 132 MMHG | HEART RATE: 72 BPM | DIASTOLIC BLOOD PRESSURE: 64 MMHG

## 2023-03-02 DIAGNOSIS — Z01.810 PRE-OPERATIVE CARDIOVASCULAR EXAMINATION: ICD-10-CM

## 2023-03-02 DIAGNOSIS — I25.10 CORONARY ARTERY DISEASE INVOLVING NATIVE CORONARY ARTERY OF NATIVE HEART, UNSPECIFIED WHETHER ANGINA PRESENT: ICD-10-CM

## 2023-03-02 DIAGNOSIS — G47.33 OBSTRUCTIVE SLEEP APNEA: ICD-10-CM

## 2023-03-02 DIAGNOSIS — I48.0 PAF (PAROXYSMAL ATRIAL FIBRILLATION): Primary | ICD-10-CM

## 2023-03-02 PROCEDURE — 99214 OFFICE O/P EST MOD 30 MIN: CPT | Performed by: INTERNAL MEDICINE

## 2023-03-02 PROCEDURE — 93000 ELECTROCARDIOGRAM COMPLETE: CPT | Performed by: INTERNAL MEDICINE

## 2023-03-02 RX ORDER — METOPROLOL SUCCINATE 50 MG/1
1 TABLET, EXTENDED RELEASE ORAL EVERY 12 HOURS SCHEDULED
COMMUNITY
Start: 2023-01-12

## 2023-03-02 NOTE — ASSESSMENT & PLAN NOTE
Cataract surgery.  Proceed as planned.  We will need to hold Eliquis only 2 days prior unless okay to proceed without holding Eliquis at all.  WILSON precautions for anesthesia.  Do not hold beta-blockers in the perioperative setting

## 2023-03-02 NOTE — PROGRESS NOTES
[facility] Provider Note     Date:   2023   Name: Albino Rivera  :   1950  PCP: Linsey Jung MD    Chief Complaint   Patient presents with   • Atrial Fibrillation       Subjective     History of Present Illness  Albino Rivera is a 72 y.o. male who presents today for follow-up.  He recently underwent pulmonary vein isolation for A-fib.  He feels great.  Has not felt this good in a while.  Very happy with his progress.  Had some mild chest tightness but has since resolved.  He denies any dizziness lightheadedness.  He had some rattling in his chest before surgery but says it improved 2 days afterwards.  Thinks he may have had some pulmonary edema.  Also asking about his sleep study.  New diagnosis.  We will send in PAP therapy.  And he also has cataract surgery coming up .    Allergies   Allergen Reactions   • Ace Inhibitors Cough       Current Outpatient Medications:   •  acetaminophen (TYLENOL) 500 MG tablet, Take 1 tablet by mouth As Needed for Mild Pain., Disp: , Rfl:   •  aspirin 81 MG chewable tablet, Chew 1 tablet Daily., Disp: , Rfl:   •  Cetirizine HCl (ZyrTEC Allergy) 10 MG capsule, Take 10 mg by mouth Daily., Disp: , Rfl:   •  Eliquis 5 MG tablet tablet, Take 1 tablet by mouth 2 (Two) Times a Day. Per Dr. Paniagua's instructions, pt is to take the last dose the night prior to procedure, Disp: , Rfl:   •  MAGnesium-Oxide 400 (240 Mg) MG tablet, Take 1 tablet by mouth Daily., Disp: , Rfl:   •  metoprolol succinate XL (TOPROL-XL) 50 MG 24 hr tablet, Take 1 tablet by mouth Every 12 (Twelve) Hours., Disp: , Rfl:   •  montelukast (SINGULAIR) 10 MG tablet, Take 1 tablet by mouth Daily., Disp: , Rfl:   •  rosuvastatin (CRESTOR) 40 MG tablet, Take 1 tablet by mouth Daily., Disp: , Rfl:   •  Symbicort 160-4.5 MCG/ACT inhaler, Inhale 2 puffs 2 (Two) Times a Day. Rinse mouth after each use, Disp: , Rfl:   •  tamsulosin (FLOMAX) 0.4 MG capsule 24 hr capsule, Take 1  capsule by mouth every night at bedtime., Disp: , Rfl:   •  Tiotropium Bromide Monohydrate (Spiriva Respimat) 1.25 MCG/ACT aerosol solution inhaler, Inhale 2 puffs Daily., Disp: , Rfl:   •  vitamin B-12 (CYANOCOBALAMIN) 1000 MCG tablet, Take 1 tablet by mouth Daily., Disp: , Rfl:     Past Medical History:   Diagnosis Date   • A-fib (HCC)    • AAA (abdominal aortic aneurysm)    • Abnormal echocardiogram    • Abnormal heart rhythm    • Acid reflux    • Athscl heart disease of native coronary artery w/o ang pctrs    • Bradycardia, unspecified    • CAD (coronary artery disease)     S/P LAD STENT 2/21 AND  RCA   • Cataracts, bilateral    • COPD (chronic obstructive pulmonary disease) (HCC)    • Dislocated elbow     AGE 16   • Double vision     SYMPTON, EPISODES OF DOUBLE VISION- RARE   • Easy bruising    • GERD (gastroesophageal reflux disease)    • High cholesterol    • History of cardioversion 10/2022    3X   • History of stress test    • Hypertension    • Irregular heart beat    • Kidney stones    • Other emphysema (HCC)    • Other hypersomnia    • Other long term (current) drug therapy    • Paroxysmal atrial fibrillation (HCC)    • Percutaneous transluminal coronary angioplasty (PTCA) within last 14 to 24 months    • Precordial pain    • Prostate disorder     PROBLEMS   • Pure hypercholesterolemia, unspecified    • Sleep apnea     pt states he recently had a sleep study and his results said he has sleep apnea   • Supraventricular tachycardia (HCC)    • Wears glasses       Past Surgical History:   Procedure Laterality Date   • APPENDECTOMY  1959   • CARDIAC CATHETERIZATION     • CARDIAC ELECTROPHYSIOLOGY PROCEDURE N/A 2/13/2023    Procedure: Ablation atrial fibrillation. Hold Eliquis the morning of the procedure.;  Surgeon: Manjeet Paniagua MD;  Location: [facility] INVASIVE LOCATION;  Service: Cardiovascular;  Laterality: N/A;   • COLONOSCOPY     • CORONARY ANGIOPLASTY WITH STENT PLACEMENT      one stent   • KIDNEY  "STONE SURGERY      [id number]   • POLYPECTOMY     • UMBILICAL HERNIA REPAIR  2019     Family History   Problem Relation Age of Onset   • Heart failure Father    • Heart attack Brother    • Prostate cancer Brother    • Melanoma Brother    • Atrial fibrillation Brother      Social History     Socioeconomic History   • Marital status:    • Number of children: 2   Tobacco Use   • Smoking status: Former     Packs/day: 2.00     Years: 17.00     Pack years: 34.00     Types: Cigarettes     Quit date:      Years since quittin.1   • Smokeless tobacco: Never   Vaping Use   • Vaping Use: Never used   Substance and Sexual Activity   • Alcohol use: Not Currently     Comment: a few drink a week   • Drug use: Never   • Sexual activity: Defer       Objective     Vital Signs:  /64   Pulse 72   Ht 177.8 cm (70\")   Wt 96.2 kg (212 lb)   SpO2 99%   BMI 30.42 kg/m²   Estimated body mass index is 30.42 kg/m² as calculated from the following:    Height as of this encounter: 177.8 cm (70\").    Weight as of this encounter: 96.2 kg (212 lb).             Physical Exam      Cardiology studies reviewed: Ablation and Consultant notes reviewed: Dr. Paniagua      ECG 12 Lead    Date/Time: 3/2/2023 11:42 AM  Performed by: Aracelis Prince MD  Authorized by: Aracelis Prince MD   Comparison: not compared with previous ECG   Rhythm: sinus rhythm  Ectopy: unifocal PVCs  Rate: normal  Conduction: non-specific intraventricular conduction delay  ST Segments: ST segments normal  T Waves: T waves normal  QRS axis: normal    Clinical impression: non-specific ECG             Assessment and Plan     Diagnoses and all orders for this visit:    1. PAF (paroxysmal atrial fibrillation) ([facility]) (Primary)  Assessment & Plan:  Status post ablation.  EKG updated today.  Doing great.    Orders:  -     ECG 12 Lead    2. Coronary artery disease involving native coronary artery of native heart, unspecified whether angina present  Assessment & " Plan:  No new chest pain or shortness of breath.  Doing great.  On medical therapy.    Orders:  -     ECG 12 Lead    3. Obstructive sleep apnea  Assessment & Plan:  New diagnosis.  We will send in PAP therapy.  Moderate with AHI of 16.    Orders:  -     PAP Therapy    4. Pre-operative cardiovascular examination  Assessment & Plan:  Cataract surgery.  Proceed as planned.  We will need to hold Eliquis only 2 days prior unless okay to proceed without holding Eliquis at all.  WILSON precautions for anesthesia.  Do not hold beta-blockers in the perioperative setting    Orders:  -     ECG 12 Lead      Recommendations: ER if symptoms increase, Sleep hygiene discussed, Report if any new/changing symptoms immediately, Limitations of preop risk stratification reviewed, WILSON precautions for anesthesia, Minimize time off anti-platelet/anti-coagulant and Do not hold beta-blockers in the avery-operative period          Follow Up  Return in about 2 months (around 5/2/2023) for Recheck symptoms.  Patient was given instructions and counseling regarding his condition or for health maintenance advice. Please see specific information pulled into the AVS if appropriate.

## 2023-03-23 RX ORDER — ROSUVASTATIN CALCIUM 40 MG/1
40 TABLET, COATED ORAL DAILY
Qty: 90 TABLET | Refills: 5 | Status: SHIPPED | OUTPATIENT
Start: 2023-03-23

## 2023-03-23 NOTE — TELEPHONE ENCOUNTER
PT called in requesting refill sent in on Rosuvastatin 40mg daily as well as Eliquis 5mg one BID to [facility] in [place].  Last Lipid and liver profile was 1.30.2023; last OV by you 3.2.2023.  Scheduled follow up 5.2.2023.

## 2023-03-27 NOTE — TELEPHONE ENCOUNTER
Patient called the pharmacy and they told him that there isn't a PA for his Eliquis.He was wondering what's going on or if he should stop taking it.

## 2023-03-28 NOTE — TELEPHONE ENCOUNTER
Should patient still be taking Eliquis? I may be missing it but I don't see where it has been discontinued. Just let me know and I will load medication to be sent. Thanks.

## 2023-03-29 RX ORDER — APIXABAN 5 MG/1
5 TABLET, FILM COATED ORAL 2 TIMES DAILY
Qty: 180 TABLET | Refills: 3 | Status: SHIPPED | OUTPATIENT
Start: 2023-03-29

## 2023-03-29 NOTE — TELEPHONE ENCOUNTER
Yes, still take eliquis. If I need to send refills we can. If its just a PA issue then we can do that too. Thanks.

## 2023-04-12 ENCOUNTER — TELEPHONE (OUTPATIENT)
Dept: CARDIOLOGY | Facility: CLINIC | Age: 73
End: 2023-04-12
Payer: MEDICARE

## 2023-04-14 ENCOUNTER — OUTSIDE FACILITY SERVICE (OUTPATIENT)
Dept: CARDIOLOGY | Facility: CLINIC | Age: 73
End: 2023-04-14
Payer: MEDICARE

## 2023-05-02 ENCOUNTER — OFFICE VISIT (OUTPATIENT)
Dept: CARDIOLOGY | Facility: CLINIC | Age: 73
End: 2023-05-02
Payer: MEDICARE

## 2023-05-02 VITALS
HEIGHT: 70 IN | HEART RATE: 50 BPM | SYSTOLIC BLOOD PRESSURE: 142 MMHG | OXYGEN SATURATION: 97 % | BODY MASS INDEX: 29.92 KG/M2 | WEIGHT: 209 LBS | DIASTOLIC BLOOD PRESSURE: 78 MMHG

## 2023-05-02 DIAGNOSIS — I48.0 PAROXYSMAL ATRIAL FIBRILLATION: Primary | ICD-10-CM

## 2023-05-02 RX ORDER — FLUTICASONE FUROATE, UMECLIDINIUM BROMIDE AND VILANTEROL TRIFENATATE 100; 62.5; 25 UG/1; UG/1; UG/1
POWDER RESPIRATORY (INHALATION) DAILY
COMMUNITY
Start: 2023-04-01

## 2023-05-02 RX ORDER — SOTALOL HYDROCHLORIDE 80 MG/1
1 TABLET ORAL EVERY 12 HOURS SCHEDULED
COMMUNITY
Start: 2023-04-13

## 2023-05-02 RX ORDER — PREDNISOLONE ACETATE 10 MG/ML
SUSPENSION/ DROPS OPHTHALMIC
COMMUNITY
Start: 2023-03-29

## 2023-05-02 RX ORDER — MOXIFLOXACIN 5 MG/ML
SOLUTION/ DROPS OPHTHALMIC
COMMUNITY
Start: 2023-03-29

## 2023-05-02 NOTE — PROGRESS NOTES
[facility]   OFFICE NOTE      Albino Rivera  1950  PCP: Linsey Jung MD    SUBJECTIVE:   Albino Rivera is a 72 y.o. male seen for a follow up visit regarding the following:     CC:Afib    HPI:   Nima 72-year-old gentleman presents today for follow-up regarding atrial fibrillation, status post successful pulmonary vein ablation procedure February 2013.  Patient tells me that 2 to 3 weeks ago he is admitted to [facility] recurrent A-fib.  He was seen by his primary cardiologist and started on sotalol 80 mg p.o. twice daily.  Since then he has had no recurrent events.  He states his energy levels a little low but he has not had difficult dizzy nursing or sink.  He is not having any chest pain.  He is tolerating his Eliquis well with no issues with blood thinners.    Cardiac PMH: (Old records have been reviewed and summarized below)  Problem list:   1. PAF  a. Atrial fibrillation/flutter diagnosed on Holter monitor for 6 days revealing episodes of atrial tachycardia/flutter January 2021  b. Atrial fibrillation with RVR requiring cardioversion procedure June 2022 outside hospital in [place].  c. Symptomatic with palpitations  d. Chads Vascor equal to 3 chronic Eliquis therapy  e. Sotalol therapy ordered per local cardiologist November 2022  f.  S/p pulmonary vein ablation procedure and successful RFA of left-sided atrial flutter around the mitral valve annulus.  Additional ablation for triggers of atrial for been the anterior wall of the left atrium by Dr. Paniagua on February 13, 2023.  2. Coronary disease  a. Angina pectoris abnormal stress echo small codominant ardiogram with echo revealing apical septal hypokinesis 2021  b. Left heart catheterization and LAD stent, 40% circumflex artery stenosis, with noted  of small codominant RCA February 2021 [facility]  c. Echocardiogram December 30, 2021 EF 45 to 50% mild LVH, left atrium is  "mildly dilated moderate AI mild MR  3. COPD  4. Hyperlipidemia  5. Abdominal aortic aneurysm: Followed by Dr. Prince  6. WILSON-CPAP 3 weeks.     Past Medical History, Past Surgical History, Family history, Social History, and Medications were all reviewed with the patient today and updated as necessary.       Current Outpatient Medications:   •  acetaminophen (TYLENOL) 500 MG tablet, Take 1 tablet by mouth As Needed for Mild Pain., Disp: , Rfl:   •  aspirin 81 MG chewable tablet, Chew 1 tablet Daily., Disp: , Rfl:   •  Cetirizine HCl (ZyrTEC Allergy) 10 MG capsule, Take 10 mg by mouth Daily., Disp: , Rfl:   •  Eliquis 5 MG tablet tablet, Take 1 tablet by mouth 2 (Two) Times a Day., Disp: 180 tablet, Rfl: 3  •  Fluticasone-Umeclidin-Vilant (Trelegy Ellipta) 100-62.5-25 MCG/ACT inhaler, Daily., Disp: , Rfl:   •  MAGnesium-Oxide 400 (240 Mg) MG tablet, Take 1 tablet by mouth Daily., Disp: , Rfl:   •  metoprolol tartrate (LOPRESSOR) 25 MG tablet, Take 1 tablet by mouth 2 (Two) Times a Day., Disp: , Rfl:   •  montelukast (SINGULAIR) 10 MG tablet, Take 1 tablet by mouth Daily., Disp: , Rfl:   •  moxifloxacin (VIGAMOX) 0.5 % ophthalmic solution, , Disp: , Rfl:   •  prednisoLONE acetate (PRED FORTE) 1 % ophthalmic suspension, , Disp: , Rfl:   •  rosuvastatin (CRESTOR) 40 MG tablet, TAKE 1 TABLET BY MOUTH DAILY, Disp: 90 tablet, Rfl: 5  •  sotalol (BETAPACE) 80 MG tablet, Take 1 tablet by mouth Every 12 (Twelve) Hours., Disp: , Rfl:   •  tamsulosin (FLOMAX) 0.4 MG capsule 24 hr capsule, Take 1 capsule by mouth every night at bedtime., Disp: , Rfl:   •  vitamin B-12 (CYANOCOBALAMIN) 1000 MCG tablet, Take 1 tablet by mouth Daily., Disp: , Rfl:       Allergies   Allergen Reactions   • Ace Inhibitors Cough         PHYSICAL EXAM:    /78 (BP Location: Left arm, Patient Position: Sitting)   Pulse 50   Ht 177.8 cm (70\")   Wt 94.8 kg (209 lb)   SpO2 97%   BMI 29.99 kg/m²        Wt Readings from Last 5 Encounters:   05/02/23 " 94.8 kg (209 lb)   03/02/23 96.2 kg (212 lb)   02/13/23 95.8 kg (211 lb 3.2 oz)   02/06/23 96.6 kg (212 lb 13.7 oz)   01/10/23 96.2 kg (212 lb)       BP Readings from Last 5 Encounters:   05/02/23 142/78   03/02/23 132/64   02/13/23 143/78   01/10/23 162/70       General appearance - Alert, well appearing, and in no distress   Mental status - Affect appropriate to mood.  Eyes - Sclerae anicteric,  ENMT - Hearing grossly normal bilaterally, Dental hygiene good.  Neck - Carotids upstroke normal bilaterally, no bruits, no JVD.  Resp - Clear to auscultation, no wheezes, rales or rhonchi, symmetric air entry.  Heart - Normal rate, regular rhythm, normal S1, S2, no murmurs, rubs, clicks or gallops.  GI - Soft, nontender, nondistended, no masses or organomegaly.  Neurological - Grossly intact - normal speech, no focal findings  Musculoskeletal - No joint tenderness, deformity or swelling, no muscular tenderness noted.  Extremities - Peripheral pulses normal, no pedal edema, no clubbing or cyanosis.  Skin - Normal coloration and turgor.  Psych -  oriented to person, place, and time.    Medical problems and test results were reviewed with the patient today.     No results found for this or any previous visit (from the past 672 hour(s)).      EKG: (EKG has been independently visualized by me and summarized below)  3/2/2023     ECG 12 Lead    Date/Time: 5/2/2023 9:47 AM  Performed by: Jung Mattson PA  Authorized by: Jung Mattson PA   Comparison: compared with previous ECG from 3/2/2023  Similar to previous ECG  Rhythm: sinus bradycardia  Rate: bradycardic  Conduction: conduction normal  T Waves: T waves normal  QRS axis: normal  Other: no other findings    Clinical impression: abnormal EKG              ASSESSMENT   1.  Atrial fibrillation, atrial flutter: Status post successful EP study pulmonary vein ablation procedure, left atrial flutter ablation by Dr. Paniagua February 13, 2023.  Recurrent A-fib/flutter with  admission to [facility] (no records are available for review).  Patient was started on sotalol therapy pressures with his primary cardiologist.  EKG today reveals sinus bradycardia QTc acceptable.    2.  Coronary disease/ischemic cardiomyopathy: No ongoing angina symptoms.  Continue aspirin statin therapy.  In addition continue guideline directed medical therapy, beta-blocker.  Followed by Dr. Prince.     3.  Anticoagulation: [id number] equal to 3, chronic Eliquis therapy.    PLAN  · Patient started on sotalol therapy for recurrent episode of A-fib/flutter a few weeks ago at [facility].  We will try obtain records of this admission.  EKG today is sinus bradycardia.  We will continue medication for now.  Discussed with patient to monitor heart rate closely if decreased or has symptoms will reduce sotalol 80mg daily.  · Return to follow-up as scheduled.    5/2/2023  09:39 EDT  Will Srinivasan OGLESBY

## 2023-05-11 ENCOUNTER — OFFICE VISIT (OUTPATIENT)
Dept: CARDIOLOGY | Facility: CLINIC | Age: 73
End: 2023-05-11
Payer: MEDICARE

## 2023-05-11 VITALS
BODY MASS INDEX: 30.21 KG/M2 | HEIGHT: 70 IN | DIASTOLIC BLOOD PRESSURE: 70 MMHG | SYSTOLIC BLOOD PRESSURE: 140 MMHG | HEART RATE: 59 BPM | OXYGEN SATURATION: 96 % | WEIGHT: 211 LBS

## 2023-05-11 DIAGNOSIS — I48.0 PAROXYSMAL ATRIAL FIBRILLATION: Primary | ICD-10-CM

## 2023-05-11 DIAGNOSIS — I25.10 CORONARY ARTERY DISEASE INVOLVING NATIVE CORONARY ARTERY OF NATIVE HEART, UNSPECIFIED WHETHER ANGINA PRESENT: ICD-10-CM

## 2023-05-11 DIAGNOSIS — G47.33 OBSTRUCTIVE SLEEP APNEA: ICD-10-CM

## 2023-05-11 DIAGNOSIS — I10 HYPERTENSION, ESSENTIAL: ICD-10-CM

## 2023-05-11 RX ORDER — SOTALOL HYDROCHLORIDE 80 MG/1
80 TABLET ORAL EVERY 12 HOURS SCHEDULED
Qty: 90 TABLET | Refills: 3 | Status: SHIPPED | OUTPATIENT
Start: 2023-05-11

## 2023-05-11 NOTE — PROGRESS NOTES
[facility] Provider Note     Date:   2023   Name: Albino Rivera  :   1950  PCP: Linsey Jung MD    Chief Complaint   Patient presents with   • Follow-up   • Sleep Apnea     31-90 day       Subjective     History of Present Illness  Albino Rivera is a 72 y.o. male who presents today for follow-up on hospitalization for A-fib and 31 to 91-day download on CPAP.  He reports doing well since starting sotalol.  He has not had any further A-fib episodes.  He denies any chest pain or shortness of breath.  He is getting ready to go to [place] for 6 months.  He has a cardiologist and a primary care doctor there.  He recently saw EP cardiology in [place].  They noted his heart rate was somewhat low.  He denies any dizziness or lightheadedness.  He has not had any syncopal episodes.  He feels like he is tolerating the low heart rates okay and does not really want to stop the sotalol at this time.  He is wearing his PAP device faithfully and getting good benefit out of it.  His AHI is mildly high but he denies any mask leak.    Cardiology/Sleep History  1. CAD  -Left heart catheterization and LAD stent, 40% circumflex artery stenosis, with noted  of small codominant RCA 2021 [facility]  2. Paroxysmal a fib   -diagnosed 2021, cardioversion in [place]   -PVI with RFA of LA flutter and mitral annulus and anterior LA wall 23 Siva  -recurrent post PVI a fib 2023 in [facility] started on sotalol  3. WILSON  -AHI 16 on HST 2022  -on CPAP  4. AAA 3.5cm last eval 3/4/22    Allergies   Allergen Reactions   • Ace Inhibitors Cough       Current Outpatient Medications:   •  acetaminophen (TYLENOL) 500 MG tablet, Take 1 tablet by mouth As Needed for Mild Pain., Disp: , Rfl:   •  aspirin 81 MG chewable tablet, Chew 1 tablet Daily., Disp: , Rfl:   •  Cetirizine HCl (ZyrTEC Allergy) 10 MG capsule, Take 10 mg by mouth Daily., Disp: , Rfl:   •  Eliquis 5  MG tablet tablet, Take 1 tablet by mouth 2 (Two) Times a Day., Disp: 180 tablet, Rfl: 3  •  Fluticasone-Umeclidin-Vilant (Trelegy Ellipta) 100-62.5-25 MCG/ACT inhaler, Daily., Disp: , Rfl:   •  MAGnesium-Oxide 400 (240 Mg) MG tablet, Take 1 tablet by mouth Daily., Disp: , Rfl:   •  metoprolol tartrate (LOPRESSOR) 25 MG tablet, Take 1 tablet by mouth 2 (Two) Times a Day., Disp: 180 tablet, Rfl: 3  •  montelukast (SINGULAIR) 10 MG tablet, Take 1 tablet by mouth Daily., Disp: , Rfl:   •  rosuvastatin (CRESTOR) 40 MG tablet, TAKE 1 TABLET BY MOUTH DAILY, Disp: 90 tablet, Rfl: 5  •  sotalol (BETAPACE) 80 MG tablet, Take 1 tablet by mouth Every 12 (Twelve) Hours., Disp: 90 tablet, Rfl: 3  •  tamsulosin (FLOMAX) 0.4 MG capsule 24 hr capsule, Take 1 capsule by mouth every night at bedtime., Disp: , Rfl:   •  vitamin B-12 (CYANOCOBALAMIN) 1000 MCG tablet, Take 1 tablet by mouth Daily., Disp: , Rfl:     Past Medical History:   Diagnosis Date   • A-fib    • AAA (abdominal aortic aneurysm)    • Abnormal echocardiogram    • Abnormal heart rhythm    • Acid reflux    • Aneurysm 2019   • Athscl heart disease of native coronary artery w/o ang pctrs    • Bradycardia, unspecified    • CAD (coronary artery disease)     S/P LAD STENT 2/21 AND  RCA   • Cataracts, bilateral    • COPD (chronic obstructive pulmonary disease)    • Dislocated elbow     AGE 16   • Double vision     SYMPTON, EPISODES OF DOUBLE VISION- RARE   • Easy bruising    • GERD (gastroesophageal reflux disease)    • High cholesterol    • History of cardioversion 10/2022    3X   • History of stress test    • Hypertension    • Irregular heart beat    • Kidney stones    • Other emphysema    • Other hypersomnia    • Other long term (current) drug therapy    • Paroxysmal atrial fibrillation    • Percutaneous transluminal coronary angioplasty (PTCA) within last 14 to 24 months    • Precordial pain    • Prostate disorder     PROBLEMS   • Pure hypercholesterolemia, unspecified  "   • Sleep apnea     pt states he recently had a sleep study and his results said he has sleep apnea   • Supraventricular tachycardia    • Wears glasses       Past Surgical History:   Procedure Laterality Date   • APPENDECTOMY     • CARDIAC CATHETERIZATION     • CARDIAC ELECTROPHYSIOLOGY PROCEDURE N/A 2023    Procedure: Ablation atrial fibrillation. Hold Eliquis the morning of the procedure.;  Surgeon: Manjeet Paniagua MD;  Location: [facility] INVASIVE LOCATION;  Service: Cardiovascular;  Laterality: N/A;   • CATARACT EXTRACTION Bilateral    • COLONOSCOPY     • CORONARY ANGIOPLASTY WITH STENT PLACEMENT      one stent   • KIDNEY STONE SURGERY      [id number]   • POLYPECTOMY     • UMBILICAL HERNIA REPAIR       Family History   Problem Relation Age of Onset   • Heart failure Father    • Heart attack Father    • Heart attack Brother    • Prostate cancer Brother    • Asthma Brother    • Melanoma Brother    • Atrial fibrillation Brother    • Heart attack Brother      Social History     Socioeconomic History   • Marital status:    • Number of children: 2   Tobacco Use   • Smoking status: Former     Packs/day: 2.00     Years: 17.00     Pack years: 34.00     Types: Cigarettes     Start date: 1968     Quit date: 1984     Years since quittin.3   • Smokeless tobacco: Never   Vaping Use   • Vaping Use: Never used   Substance and Sexual Activity   • Alcohol use: Not Currently     Comment: a few drink a week   • Drug use: Never   • Sexual activity: Not Currently     Partners: Female     Birth control/protection: Condom, Pill       Objective     Vital Signs:  /70 (BP Location: Left arm)   Pulse 59   Ht 177.8 cm (70\")   Wt 95.7 kg (211 lb)   SpO2 96%   BMI 30.28 kg/m²   Estimated body mass index is 30.28 kg/m² as calculated from the following:    Height as of this encounter: 177.8 cm (70\").    Weight as of this encounter: 95.7 kg (211 lb).       BMI is >= 30 and <35. (Class 1 Obesity). The " following options were offered after discussion;: referral to primary care      Physical Exam  Vitals reviewed.   Constitutional:       General: He is not in acute distress.     Appearance: Normal appearance.   HENT:      Head: Normocephalic and atraumatic.      Mouth/Throat:      Mouth: Mucous membranes are moist.   Eyes:      Conjunctiva/sclera: Conjunctivae normal.   Neck:      Vascular: No carotid bruit.   Cardiovascular:      Rate and Rhythm: Normal rate and regular rhythm.      Pulses: Normal pulses.      Heart sounds: Normal heart sounds. No murmur heard.  Pulmonary:      Effort: Pulmonary effort is normal. No respiratory distress.      Breath sounds: Normal breath sounds. No wheezing or rhonchi.   Abdominal:      General: Abdomen is flat.      Palpations: Abdomen is soft.   Musculoskeletal:      Cervical back: Normal range of motion and neck supple.      Right lower leg: No edema.      Left lower leg: No edema.   Skin:     General: Skin is warm and dry.      Coloration: Skin is not jaundiced.   Neurological:      General: No focal deficit present.      Mental Status: He is alert and oriented to person, place, and time. Mental status is at baseline.      GCS: GCS eye subscore is 4. GCS verbal subscore is 5. GCS motor subscore is 6.      Cranial Nerves: No cranial nerve deficit.      Motor: No weakness.      Gait: Gait normal.   Psychiatric:         Mood and Affect: Mood and affect normal. Mood is not anxious.         Speech: Speech normal.         Behavior: Behavior normal.           Consultant notes reviewed: Dr. Paniagua's PA and PAP download reviewed: good compliance acceptable controll          Assessment and Plan     Diagnoses and all orders for this visit:    1. Paroxysmal atrial fibrillation (Primary)  Comments:  Discussed with patient we will continue sotalol now.  Consider stopping at next appointment.  Continue Eliquis for stroke prophylaxis.  Orders:  -     sotalol (BETAPACE) 80 MG tablet; Take 1  tablet by mouth Every 12 (Twelve) Hours.  Dispense: 90 tablet; Refill: 3  -     metoprolol tartrate (LOPRESSOR) 25 MG tablet; Take 1 tablet by mouth 2 (Two) Times a Day.  Dispense: 180 tablet; Refill: 3    2. Coronary artery disease involving native coronary artery of native heart, unspecified whether angina present  Comments:  Doing well.  No chest pain.  On medical therapy.    3. Obstructive sleep apnea  Comments:  PAP device download reviewed.  Good compliance.  Control slightly suboptimal.  Assess leaks and mask.    4. Hypertension, essential  Comments:  High today but reports blood pressure running in the 120s at home.  He and I will both continue to monitor.        Recommendations: ER if symptoms increase and Report if any new/changing symptoms immediately          Follow Up  Return in about 6 months (around 11/11/2023) for Next scheduled follow up.  Patient was given instructions and counseling regarding his condition or for health maintenance advice. Please see specific information pulled into the AVS if appropriate.

## 2023-05-12 ENCOUNTER — TELEPHONE (OUTPATIENT)
Dept: CARDIOLOGY | Facility: CLINIC | Age: 73
End: 2023-05-12
Payer: MEDICARE

## 2023-05-12 NOTE — TELEPHONE ENCOUNTER
"  Caller: Albino Rivera \"Romero\"    Relationship: Self    Best call back number: [phone]    What is the best time to reach you: ANY    Who are you requesting to speak with (clinical staff, provider,  specific staff member): CLINICAL    What was the call regarding: PT STATES HE WENT TO  HIS MEDICATIONS FROM THE PHARMACY & WHEN HE HAD GOTTEN HOME & CHECKED THEM HE NOTICED HIS METOPROLOL WASN'T THE SAME AS HE USUALLY TAKES. PT STATES THE MED HE RECEIVED TODAY WAS METOPROLOL TARTRATE AS OPPOSED TO THE SUCCINATE. PT WOULD LIKE CLARIFICATION. PLEASE CALL PT TO ADVISE FURTHER.      Do you require a callback: YES             "

## 2023-10-28 DIAGNOSIS — I48.0 PAROXYSMAL ATRIAL FIBRILLATION: ICD-10-CM

## 2023-10-30 RX ORDER — SOTALOL HYDROCHLORIDE 80 MG/1
80 TABLET ORAL EVERY 12 HOURS
Qty: 90 TABLET | Refills: 3 | Status: SHIPPED | OUTPATIENT
Start: 2023-10-30

## 2023-11-15 ENCOUNTER — OFFICE VISIT (OUTPATIENT)
Dept: CARDIOLOGY | Facility: CLINIC | Age: 73
End: 2023-11-15
Payer: MEDICARE

## 2023-11-15 VITALS
BODY MASS INDEX: 30.69 KG/M2 | HEART RATE: 58 BPM | DIASTOLIC BLOOD PRESSURE: 64 MMHG | OXYGEN SATURATION: 96 % | SYSTOLIC BLOOD PRESSURE: 140 MMHG | HEIGHT: 70 IN | WEIGHT: 214.4 LBS

## 2023-11-15 DIAGNOSIS — I48.0 PAROXYSMAL ATRIAL FIBRILLATION: Primary | ICD-10-CM

## 2023-11-15 PROCEDURE — 3077F SYST BP >= 140 MM HG: CPT | Performed by: PHYSICIAN ASSISTANT

## 2023-11-15 PROCEDURE — 93000 ELECTROCARDIOGRAM COMPLETE: CPT | Performed by: PHYSICIAN ASSISTANT

## 2023-11-15 PROCEDURE — 3078F DIAST BP <80 MM HG: CPT | Performed by: PHYSICIAN ASSISTANT

## 2023-11-15 PROCEDURE — 99214 OFFICE O/P EST MOD 30 MIN: CPT | Performed by: PHYSICIAN ASSISTANT

## 2023-11-15 NOTE — PROGRESS NOTES
[facility]   OFFICE NOTE      Albino Rivera  1950  PCP: Linsey Jung MD    SUBJECTIVE:   Albino Rivera is a 73 y.o. male seen for a follow up visit regarding the following:     CC:Afib    HPI:   Pleasant 72-year-old gentleman presents today for follow-up regarding atrial fibrillation, status post successful pulmonary vein ablation procedure February 2013.  He was last seen in our office May 2, 2023.  During this time he had been restarted on sotalol therapy after admission to [facility].  Since then on sotalol has been doing quite well.  Said no A-fib that he is aware of.  He has no chest pain dizziness near syncope or syncope.  He has no heart failure symptoms.  He remains on anticoagulation and tolerating well.  He states he is due to see his primary cardiologist tomorrow.    Cardiac PMH: (Old records have been reviewed and summarized below)  Problem list:   PAF  Atrial fibrillation/flutter diagnosed on Holter monitor for 6 days revealing episodes of atrial tachycardia/flutter January 2021  Atrial fibrillation with RVR requiring cardioversion procedure June 2022 outside hospital in [place].  Symptomatic with palpitations  Chads Vascor equal to 3 chronic Eliquis therapy  Sotalol therapy ordered per local cardiologist November 2022   S/p pulmonary vein ablation procedure and successful RFA of left-sided atrial flutter around the mitral valve annulus.  Additional ablation for triggers of atrial for been the anterior wall of the left atrium by Dr. Paniagua on February 13, 2023.  Coronary disease  Angina pectoris abnormal stress echo small codominant ardiogram with echo revealing apical septal hypokinesis 2021  Left heart catheterization and LAD stent, 40% circumflex artery stenosis, with noted  of small codominant RCA February 2021 [facility]  Echocardiogram December 30, 2021 EF 45 to 50% mild LVH, left atrium is mildly dilated moderate AI  mild MR  COPD  Hyperlipidemia  Abdominal aortic aneurysm: Followed by Dr. Prince  WILSON-CPAP 3 weeks.     Past Medical History, Past Surgical History, Family history, Social History, and Medications were all reviewed with the patient today and updated as necessary.       Current Outpatient Medications:     acetaminophen (TYLENOL) 500 MG tablet, Take 1 tablet by mouth As Needed for Mild Pain., Disp: , Rfl:     aspirin 81 MG chewable tablet, Chew 1 tablet Daily., Disp: , Rfl:     Cetirizine HCl (ZyrTEC Allergy) 10 MG capsule, Take 10 mg by mouth Daily., Disp: , Rfl:     Eliquis 5 MG tablet tablet, Take 1 tablet by mouth 2 (Two) Times a Day., Disp: 180 tablet, Rfl: 3    Fluticasone-Umeclidin-Vilant (Trelegy Ellipta) 100-62.5-25 MCG/ACT inhaler, Daily., Disp: , Rfl:     MAGnesium-Oxide 400 (240 Mg) MG tablet, Take 1 tablet by mouth Daily., Disp: , Rfl:     metoprolol tartrate (LOPRESSOR) 25 MG tablet, Take 1 tablet by mouth 2 (Two) Times a Day., Disp: 180 tablet, Rfl: 3    montelukast (SINGULAIR) 10 MG tablet, Take 1 tablet by mouth Daily., Disp: , Rfl:     rosuvastatin (CRESTOR) 40 MG tablet, TAKE 1 TABLET BY MOUTH DAILY, Disp: 90 tablet, Rfl: 5    sotalol (BETAPACE) 80 MG tablet, TAKE 1 TABLET BY MOUTH EVERY 12 HOURS, Disp: 90 tablet, Rfl: 3    tamsulosin (FLOMAX) 0.4 MG capsule 24 hr capsule, Take 1 capsule by mouth every night at bedtime., Disp: , Rfl:     vitamin B-12 (CYANOCOBALAMIN) 1000 MCG tablet, Take 1 tablet by mouth Daily., Disp: , Rfl:       Allergies   Allergen Reactions    Ace Inhibitors Cough         PHYSICAL EXAM:    There were no vitals taken for this visit.       Wt Readings from Last 5 Encounters:   05/11/23 95.7 kg (211 lb)   05/02/23 94.8 kg (209 lb)   03/02/23 96.2 kg (212 lb)   02/13/23 95.8 kg (211 lb 3.2 oz)   02/06/23 96.6 kg (212 lb 13.7 oz)       BP Readings from Last 5 Encounters:   05/11/23 140/70   05/02/23 142/78   03/02/23 132/64   02/13/23 143/78   01/10/23 162/70       General  appearance - Alert, well appearing, and in no distress   Mental status - Affect appropriate to mood.  Eyes - Sclerae anicteric,  ENMT - Hearing grossly normal bilaterally, Dental hygiene good.  Neck - Carotids upstroke normal bilaterally, no bruits, no JVD.  Resp - Clear to auscultation, no wheezes, rales or rhonchi, symmetric air entry.  Heart - Normal rate, regular rhythm, normal S1, S2, no murmurs, rubs, clicks or gallops.  GI - Soft, nontender, nondistended, no masses or organomegaly.  Neurological - Grossly intact - normal speech, no focal findings  Musculoskeletal - No joint tenderness, deformity or swelling, no muscular tenderness noted.  Extremities - Peripheral pulses normal, no pedal edema, no clubbing or cyanosis.  Skin - Normal coloration and turgor.  Psych -  oriented to person, place, and time.    Medical problems and test results were reviewed with the patient today.     No results found for this or any previous visit (from the past 672 hour(s)).      EKG: (EKG has been independently visualized by me and summarized below)  3/2/2023     ECG 12 Lead    Date/Time: 11/15/2023 2:11 PM  Performed by: Jung Mattson PA    Authorized by: Jung Mattson PA  Comparison: compared with previous ECG from 3/2/2023  Similar to previous ECG  Rhythm: sinus rhythm  Rate: normal  Conduction: conduction normal  Conduction: 1st degree AV block  QRS axis: normal  Other findings: left ventricular hypertrophy    Clinical impression: non-specific ECG            ASSESSMENT   1.  Atrial fibrillation, atrial flutter: Status post successful EP study pulmonary vein ablation procedure, left atrial flutter ablation by Dr. Paniagua February 13, 2023.  Recurrent A-fib/flutter with admission to [facility] (no records are available for review).  Patient was started on sotalol therapy pressures with his primary cardiologist.  EKG today reveals sinus bradycardia QTc acceptable.    2.  Coronary disease/ischemic  cardiomyopathy: No ongoing angina symptoms.  Continue aspirin statin therapy.  In addition continue guideline directed medical therapy, beta-blocker.  Followed by Dr. Prince.     3.  Anticoagulation: [id number] equal to 3, chronic Eliquis therapy.    PLAN  Stable CV course can consider stopping sotalol at this point however the patient would like to defer he states he is doing so well on this medication would like to continue for now.  Return for follow-up or office as scheduled.    11/15/2023  12:00 NANDA Mattson PA-C

## 2023-11-16 ENCOUNTER — OFFICE VISIT (OUTPATIENT)
Dept: CARDIOLOGY | Facility: CLINIC | Age: 73
End: 2023-11-16
Payer: MEDICARE

## 2023-11-16 VITALS
HEART RATE: 60 BPM | DIASTOLIC BLOOD PRESSURE: 78 MMHG | WEIGHT: 216 LBS | OXYGEN SATURATION: 97 % | BODY MASS INDEX: 30.92 KG/M2 | HEIGHT: 70 IN | SYSTOLIC BLOOD PRESSURE: 140 MMHG

## 2023-11-16 DIAGNOSIS — I25.10 CORONARY ARTERY DISEASE INVOLVING NATIVE CORONARY ARTERY OF NATIVE HEART, UNSPECIFIED WHETHER ANGINA PRESENT: ICD-10-CM

## 2023-11-16 DIAGNOSIS — I48.0 PAROXYSMAL ATRIAL FIBRILLATION: ICD-10-CM

## 2023-11-16 DIAGNOSIS — G47.33 OBSTRUCTIVE SLEEP APNEA: ICD-10-CM

## 2023-11-16 DIAGNOSIS — I10 ESSENTIAL (PRIMARY) HYPERTENSION: ICD-10-CM

## 2023-11-16 DIAGNOSIS — I71.40 ABDOMINAL AORTIC ANEURYSM (AAA) WITHOUT RUPTURE, UNSPECIFIED PART: Primary | ICD-10-CM

## 2023-11-16 PROCEDURE — 99214 OFFICE O/P EST MOD 30 MIN: CPT | Performed by: INTERNAL MEDICINE

## 2023-11-16 PROCEDURE — 3078F DIAST BP <80 MM HG: CPT | Performed by: INTERNAL MEDICINE

## 2023-11-16 PROCEDURE — 3077F SYST BP >= 140 MM HG: CPT | Performed by: INTERNAL MEDICINE

## 2023-11-16 RX ORDER — ROSUVASTATIN CALCIUM 40 MG/1
40 TABLET, COATED ORAL DAILY
Qty: 90 TABLET | Refills: 5 | Status: SHIPPED | OUTPATIENT
Start: 2023-11-16

## 2023-11-16 RX ORDER — APIXABAN 5 MG/1
5 TABLET, FILM COATED ORAL 2 TIMES DAILY
Qty: 180 TABLET | Refills: 3 | Status: SHIPPED | OUTPATIENT
Start: 2023-11-16

## 2023-11-18 NOTE — PROGRESS NOTES
[facility] Provider Note     Date:   2023   Name: Albino Rivera  :   1950  PCP: Linsey Jung MD    Chief Complaint   Patient presents with    Atrial Fibrillation    Sleep Apnea       Subjective     History of Present Illness  Albino Rivera is a 73 y.o. male who presents today for routine 6 month follow up. He just saw EP cardiology yesterday and is doing well. They discussed wearning off his sotalol but he is reluctant since he had an a fib hospitalization about one month post PVI. He is other wise doing well. EKG showed normal QT interval yesterday. We discussed his blood pressure and he is going to start monitoring this at home and discussed at his upcoming appointment with his PCP. He will also be getting lab work as well. He denies new chest pain or shortness of breath. Overall he seeming to be feeling well.     Cardiology/Sleep History  1. CAD  -Left heart catheterization and LAD stent, 40% circumflex artery stenosis, with noted  of small codominant RCA 2021 [facility]  2. Paroxysmal a fib   -diagnosed 2021, cardioversion in [place]   -PVI with RFA of LA flutter and mitral annulus and anterior LA wall 23 Siva  -recurrent post PVI a fib 2023 in [facility] started on sotalol  3. WILSON  -AHI 16 on HST 2022  -on CPAP  4. AAA 3.5cm last eval 3/4/22    Allergies   Allergen Reactions    Ace Inhibitors Cough       Current Outpatient Medications:     acetaminophen (TYLENOL) 500 MG tablet, Take 1 tablet by mouth As Needed for Mild Pain., Disp: , Rfl:     aspirin 81 MG chewable tablet, Chew 1 tablet Daily., Disp: , Rfl:     Cetirizine HCl (ZyrTEC Allergy) 10 MG capsule, Take 10 mg by mouth Daily., Disp: , Rfl:     Eliquis 5 MG tablet tablet, Take 1 tablet by mouth 2 (Two) Times a Day., Disp: 180 tablet, Rfl: 3    Fluticasone-Umeclidin-Vilant (Trelegy Ellipta) 100-62.5-25 MCG/ACT inhaler, Daily., Disp: , Rfl:     MAGnesium-Oxide  400 (240 Mg) MG tablet, Take 1 tablet by mouth Daily., Disp: , Rfl:     metoprolol tartrate (LOPRESSOR) 25 MG tablet, Take 1 tablet by mouth 2 (Two) Times a Day., Disp: 180 tablet, Rfl: 3    montelukast (SINGULAIR) 10 MG tablet, Take 1 tablet by mouth Daily., Disp: , Rfl:     rosuvastatin (CRESTOR) 40 MG tablet, Take 1 tablet by mouth Daily., Disp: 90 tablet, Rfl: 5    sotalol (BETAPACE) 80 MG tablet, TAKE 1 TABLET BY MOUTH EVERY 12 HOURS, Disp: 90 tablet, Rfl: 3    tamsulosin (FLOMAX) 0.4 MG capsule 24 hr capsule, Take 1 capsule by mouth every night at bedtime., Disp: , Rfl:     vitamin B-12 (CYANOCOBALAMIN) 1000 MCG tablet, Take 1 tablet by mouth Daily., Disp: , Rfl:     Past Medical History:   Diagnosis Date    A-fib     AAA (abdominal aortic aneurysm)     Abnormal echocardiogram     Abnormal heart rhythm     Acid reflux     Aneurysm 2019    Athscl heart disease of native coronary artery w/o ang pctrs     Bradycardia, unspecified     CAD (coronary artery disease)     S/P LAD STENT 2/21 AND  RCA    Cataracts, bilateral     COPD (chronic obstructive pulmonary disease)     Dislocated elbow     AGE 16    Double vision     SYMPTON, EPISODES OF DOUBLE VISION- RARE    Easy bruising     GERD (gastroesophageal reflux disease)     High cholesterol     History of cardioversion 10/2022    3X    History of stress test     Hypertension     Irregular heart beat     Kidney stones     Other emphysema     Other hypersomnia     Other long term (current) drug therapy     Paroxysmal atrial fibrillation     Percutaneous transluminal coronary angioplasty (PTCA) within last 14 to 24 months     Precordial pain     Prostate disorder     PROBLEMS    Pure hypercholesterolemia, unspecified     Sleep apnea     pt states he recently had a sleep study and his results said he has sleep apnea    Supraventricular tachycardia     Wears glasses       Past Surgical History:   Procedure Laterality Date    APPENDECTOMY  1959    CARDIAC  "CATHETERIZATION      CARDIAC ELECTROPHYSIOLOGY PROCEDURE N/A 2023    Procedure: Ablation atrial fibrillation. Hold Eliquis the morning of the procedure.;  Surgeon: Manjeet Paniagua MD;  Location: [facility] INVASIVE LOCATION;  Service: Cardiovascular;  Laterality: N/A;    CATARACT EXTRACTION Bilateral     COLONOSCOPY      CORONARY ANGIOPLASTY WITH STENT PLACEMENT      one stent    KIDNEY STONE SURGERY      [id number]    POLYPECTOMY      UMBILICAL HERNIA REPAIR  2019     Family History   Problem Relation Age of Onset    Heart failure Father     Heart attack Father     Heart attack Brother     Prostate cancer Brother     Asthma Brother     Melanoma Brother     Atrial fibrillation Brother     Heart attack Brother      Social History     Socioeconomic History    Marital status:     Number of children: 2   Tobacco Use    Smoking status: Former     Packs/day: 2.00     Years: 17.00     Additional pack years: 0.00     Total pack years: 34.00     Types: Cigarettes     Start date: 1968     Quit date: 1984     Years since quittin.9     Passive exposure: Past    Smokeless tobacco: Never   Vaping Use    Vaping Use: Never used   Substance and Sexual Activity    Alcohol use: Not Currently     Comment: a few drink a week    Drug use: Never    Sexual activity: Not Currently     Partners: Female     Birth control/protection: Condom, Pill       Objective     Vital Signs:  /78 (BP Location: Left arm, Patient Position: Sitting)   Pulse 60   Ht 177.8 cm (70\")   Wt 98 kg (216 lb)   SpO2 97%   BMI 30.99 kg/m²   Estimated body mass index is 30.99 kg/m² as calculated from the following:    Height as of this encounter: 177.8 cm (70\").    Weight as of this encounter: 98 kg (216 lb).         Physical Exam  Vitals reviewed.   Constitutional:       General: He is not in acute distress.     Appearance: Normal appearance.   HENT:      Head: Normocephalic and atraumatic.      Mouth/Throat:      Mouth: Mucous " membranes are moist.   Eyes:      Conjunctiva/sclera: Conjunctivae normal.   Neck:      Vascular: No carotid bruit.   Cardiovascular:      Rate and Rhythm: Normal rate and regular rhythm.      Pulses: Normal pulses.      Heart sounds: Normal heart sounds. No murmur heard.  Pulmonary:      Effort: Pulmonary effort is normal. No respiratory distress.      Breath sounds: Normal breath sounds. No wheezing or rhonchi.   Abdominal:      General: Abdomen is flat.      Palpations: Abdomen is soft.   Musculoskeletal:      Cervical back: Normal range of motion and neck supple.      Right lower leg: No edema.      Left lower leg: No edema.   Skin:     General: Skin is warm and dry.      Coloration: Skin is not jaundiced.   Neurological:      General: No focal deficit present.      Mental Status: He is alert and oriented to person, place, and time. Mental status is at baseline.      GCS: GCS eye subscore is 4. GCS verbal subscore is 5. GCS motor subscore is 6.      Cranial Nerves: No cranial nerve deficit.      Motor: No weakness.      Gait: Gait normal.   Psychiatric:         Mood and Affect: Mood and affect normal. Mood is not anxious.         Speech: Speech normal.         Behavior: Behavior normal.                     Assessment and Plan     Diagnoses and all orders for this visit:    1. Abdominal aortic aneurysm (AAA) without rupture, unspecified part (Primary)  Comments:  Due for follow up, will schedule in office.  Orders:  -     Duplex Aorta IVC Iliac Graft Complete CAR; Future    2. Essential (primary) hypertension  Comments:  controlled, continue current medication  Orders:  -     Duplex Aorta IVC Iliac Graft Complete CAR; Future    3. Obstructive sleep apnea  Comments:  well controlled, continue pap therapy at current settings. benefiting from pap therapy  Orders:  -     PAP Therapy    4. Paroxysmal atrial fibrillation  Comments:  well controlled on sotalol, would like to remain on sotalol until 1 year post PVI since  he had initial recurrence. continue eliquis  Orders:  -     Eliquis 5 MG tablet tablet; Take 1 tablet by mouth 2 (Two) Times a Day.  Dispense: 180 tablet; Refill: 3    5. Coronary artery disease involving native coronary artery of native heart, unspecified whether angina present  Comments:  well controlled continue current medicaitons  Orders:  -     rosuvastatin (CRESTOR) 40 MG tablet; Take 1 tablet by mouth Daily.  Dispense: 90 tablet; Refill: 5        Recommendations: Report if any new/changing symptoms immediately      Follow Up  Return in about 5 months (around 4/16/2024) for Next follow up, with AAA ultrasound.    Aracelis Prince MD   11/16/2023     Please note that this explicitly excludes time spent on other separate billable services such as performing procedures or test interpretation, when applicable.    This note was created using dictation software which occasionally transcribes nonsensical phrases. Please contact the provider if any clarification is needed.

## 2024-01-18 ENCOUNTER — TELEPHONE (OUTPATIENT)
Dept: CARDIOLOGY | Facility: CLINIC | Age: 74
End: 2024-01-18
Payer: MEDICARE

## 2024-01-18 NOTE — TELEPHONE ENCOUNTER
Dr. Jung office / [facility] called to check with Dr. Prince about patient's blood. Laila called and stated that the patient's blood pressure has been running a little high recently and Dr. Blanton was wanting to add losartan to the patient's medication or something else if Dr. Prince recommends something that might be better, but wanted to check before they prescribed anything.     Per [facility] note from Dr. Jung today, it shows that patient's blood pressure was 138/66 in office today.   Please advise  Call back # [phone]

## 2024-01-31 DIAGNOSIS — I48.0 PAROXYSMAL ATRIAL FIBRILLATION: ICD-10-CM

## 2024-01-31 RX ORDER — SOTALOL HYDROCHLORIDE 80 MG/1
80 TABLET ORAL EVERY 12 HOURS
Qty: 90 TABLET | Refills: 3 | Status: SHIPPED | OUTPATIENT
Start: 2024-01-31

## 2024-02-19 PROBLEM — Z79.899 LONG TERM CURRENT USE OF ANTIARRHYTHMIC MEDICAL THERAPY: Status: ACTIVE | Noted: 2024-02-19

## 2024-02-19 NOTE — PROGRESS NOTES
Cardiac Electrophysiology Outpatient Note  [facility]    Office Visit     Albino Rivera  [id number]  02/20/2024    Primary Care Physician: Linsey Jung MD    Referred By: Dr. Prince    Subjective     Chief Complaint   Patient presents with    Atrial Fibrillation     Paroxysmal atrial fibrillation         Cardiac PMH: (Old records have been reviewed and summarized below)  Problem list:   PAF  Atrial fibrillation/flutter diagnosed on Holter monitor for 6 days revealing episodes of atrial tachycardia/flutter January 2021  Atrial fibrillation with RVR requiring cardioversion procedure June 2022 outside hospital in [place].  Symptomatic with palpitations  Chads Vascor equal to 3 chronic Eliquis therapy  Sotalol therapy ordered per local cardiologist November 2022   S/p pulmonary vein ablation procedure and successful RFA of left-sided atrial flutter around the mitral valve annulus.  Additional ablation for triggers of atrial for been the anterior wall of the left atrium by Dr. Paniagua on February 13, 2023.  Coronary disease  Angina pectoris abnormal stress echo small codominant ardiogram with echo revealing apical septal hypokinesis 2021  Left heart catheterization and LAD stent, 40% circumflex artery stenosis, with noted  of small codominant RCA February 2021 [facility]  Echocardiogram December 30, 2021 EF 45 to 50% mild LVH, left atrium is mildly dilated moderate AI mild MR  COPD  Hyperlipidemia  Abdominal aortic aneurysm: Followed by Dr. Prince  WILSON-CPAP    History of Present Illness:   Albino Rivera is a 73 y.o. male who presents to my electrophysiology clinic for follow up of atrial fibrillation, status post successful pulmonary vein ablation procedure February 2023.  He was restarted on sotalol May 2, 2023 after admission to [facility] and patient elected to continue sotalol when offered to trial period off of it. He also follows  with Dr. Prince for CAD and stable abdominal aortic aneurysm. He was last seen in our office in November '23.  Since then, he tells me he also recently had to start a new blood pressure medication.  He said no dizziness near syncope syncope denies chest pain.  Has recurrent A-fib since last seen by our office.    Past Medical History:   Diagnosis Date    A-fib     AAA (abdominal aortic aneurysm)     Abnormal echocardiogram     Abnormal heart rhythm     Acid reflux     Aneurysm 2019    Athscl heart disease of native coronary artery w/o ang pctrs     Bradycardia, unspecified     CAD (coronary artery disease)     S/P LAD STENT 2/21 AND  RCA    Cataracts, bilateral     COPD (chronic obstructive pulmonary disease)     Dislocated elbow     AGE 16    Double vision     SYMPTON, EPISODES OF DOUBLE VISION- RARE    Easy bruising     GERD (gastroesophageal reflux disease)     High cholesterol     History of cardioversion 10/2022    3X    History of stress test     Hypertension     Irregular heart beat     Kidney stones     Other emphysema     Other hypersomnia     Other long term (current) drug therapy     Paroxysmal atrial fibrillation     Percutaneous transluminal coronary angioplasty (PTCA) within last 14 to 24 months     Precordial pain     Prostate disorder     PROBLEMS    Pure hypercholesterolemia, unspecified     Sleep apnea     pt states he recently had a sleep study and his results said he has sleep apnea    Supraventricular tachycardia     Wears glasses        Past Surgical History:   Procedure Laterality Date    APPENDECTOMY  1959    CARDIAC CATHETERIZATION      CARDIAC ELECTROPHYSIOLOGY PROCEDURE N/A 02/13/2023    Procedure: Ablation atrial fibrillation. Hold Eliquis the morning of the procedure.;  Surgeon: Manjeet Paniagua MD;  Location: [facility] INVASIVE LOCATION;  Service: Cardiovascular;  Laterality: N/A;    CATARACT EXTRACTION Bilateral     COLONOSCOPY      CORONARY ANGIOPLASTY WITH STENT PLACEMENT      one  stent    KIDNEY STONE SURGERY      [id number]    POLYPECTOMY      UMBILICAL HERNIA REPAIR  2019       Family History   Problem Relation Age of Onset    Heart failure Father     Heart attack Father     Heart attack Brother     Prostate cancer Brother     Asthma Brother     Melanoma Brother     Atrial fibrillation Brother     Heart attack Brother        Social History     Socioeconomic History    Marital status:     Number of children: 2   Tobacco Use    Smoking status: Former     Packs/day: 2.00     Years: 17.00     Additional pack years: 0.00     Total pack years: 34.00     Types: Cigarettes     Start date: 1968     Quit date: 1984     Years since quittin.1     Passive exposure: Past    Smokeless tobacco: Never   Vaping Use    Vaping Use: Never used   Substance and Sexual Activity    Alcohol use: Not Currently     Alcohol/week: 7.0 standard drinks of alcohol     Types: 7 Glasses of wine per week     Comment: a few drink a week    Drug use: Never    Sexual activity: Not Currently     Partners: Female     Birth control/protection: Condom, Pill         Current Outpatient Medications:     acetaminophen (TYLENOL) 500 MG tablet, Take 650 mg by mouth As Needed for Mild Pain., Disp: , Rfl:     albuterol sulfate  (90 Base) MCG/ACT inhaler, Inhale 1 puff Every 4 (Four) Hours As Needed., Disp: , Rfl:     aspirin 81 MG chewable tablet, Chew 1 tablet Daily., Disp: , Rfl:     Cetirizine HCl (ZyrTEC Allergy) 10 MG capsule, Take 10 mg by mouth Daily., Disp: , Rfl:     Eliquis 5 MG tablet tablet, Take 1 tablet by mouth 2 (Two) Times a Day., Disp: 180 tablet, Rfl: 3    Fluticasone-Umeclidin-Vilant (Trelegy Ellipta) 100-62.5-25 MCG/ACT inhaler, Daily., Disp: , Rfl:     MAGnesium-Oxide 400 (240 Mg) MG tablet, Take 1 tablet by mouth Daily., Disp: , Rfl:     metoprolol tartrate (LOPRESSOR) 25 MG tablet, TAKE 1 TABLET BY MOUTH TWICE DAILY, Disp: 180 tablet, Rfl: 3    montelukast (SINGULAIR) 10 MG tablet, Take 1  "tablet by mouth Daily., Disp: , Rfl:     Omeprazole Magnesium (PRILOSEC PO), Take 20 mg by mouth. 3 times a week, Disp: , Rfl:     rosuvastatin (CRESTOR) 40 MG tablet, Take 1 tablet by mouth Daily., Disp: 90 tablet, Rfl: 5    sotalol (BETAPACE) 80 MG tablet, TAKE 1 TABLET BY MOUTH EVERY 12 HOURS, Disp: 90 tablet, Rfl: 3    tamsulosin (FLOMAX) 0.4 MG capsule 24 hr capsule, Take 1 capsule by mouth every night at bedtime., Disp: , Rfl:     vitamin B-12 (CYANOCOBALAMIN) 1000 MCG tablet, Take 1 tablet by mouth Daily., Disp: , Rfl:     Allergies:   Allergies   Allergen Reactions    Ace Inhibitors Cough       Objective   Vital Signs: Blood pressure 138/64, pulse 60, height 177.8 cm (70\"), weight 98.9 kg (218 lb), SpO2 96%.    PHYSICAL EXAM  General appearance: Awake, alert, cooperative  Head: Normocephalic, without obvious abnormality, atraumatic  Neck: No JVD  Lungs: Clear to ascultation bilaterally  Heart: Regular rate and rhythm, no murmurs, 2+ LE pulses, no lower extremity swelling  Skin: Skin color, turgor normal, no rashes or lesions  Neurologic: Grossly normal     Lab Results   Component Value Date    GLUCOSE 107 (H) 02/06/2023    CALCIUM 9.0 02/06/2023     02/06/2023    K 4.7 02/06/2023    CO2 28.0 02/06/2023     02/06/2023    BUN 15 02/06/2023    CREATININE 0.94 02/06/2023    EGFRIFAFRI >60 01/27/2022    EGFRIFNONA >60 01/27/2022    BCR 16.0 02/06/2023    ANIONGAP 8.0 02/06/2023     Lab Results   Component Value Date    WBC 5.08 01/18/2024    HGB 15.5 01/18/2024    HCT 46.9 01/18/2024    MCV 94 01/18/2024     01/18/2024     No results found for: \"INR\", \"PROTIME\"  No results found for: \"TSH\", \"L8KONVM\", \"C0IQTQM\", \"THYROIDAB\"              I personally viewed and interpreted the patient's EKG/Telemetry/lab data      ECG 12 Lead    Date/Time: 2/20/2024 10:08 AM  Performed by: Jung Mattson PA    Authorized by: Jung Mattson PA  Comparison: compared with previous ECG from " 11/15/2023  Similar to previous ECG  Rhythm: sinus rhythm  Rate: normal  Conduction: conduction normal  ST Segments: ST segments normal  QRS axis: normal    Clinical impression: normal ECG             Assessment & Plan    Proximal atrial fibrillation: Status post successful PVA over a year ago.  He had an episode of atrial flutter after a painful dental visit 2 and half months after the PVA.  He was admitted to our hospital and restarted on sotalol therapy.  Since then has had no recurrent A-fib.  We have offered him the option of stopping sotalol at this point but he declines he feels he is doing well and would like to continue medications for now.  He may ask further questions with his primary cardiologist.  EKG is stable.  Hypertension: He recently started new medication, he states his blood pressure improving.  He is also going to exercise more to try to help with improving blood pressure.  Anticoagulation: Continue Eliquis therapy is having no bleeding issues tolerating well.    Electronically signed by CHRISTINA Schrader, 02/20/24, 10:10 AM EST.

## 2024-02-20 ENCOUNTER — OFFICE VISIT (OUTPATIENT)
Dept: CARDIOLOGY | Facility: CLINIC | Age: 74
End: 2024-02-20
Payer: MEDICARE

## 2024-02-20 VITALS
BODY MASS INDEX: 31.21 KG/M2 | OXYGEN SATURATION: 96 % | DIASTOLIC BLOOD PRESSURE: 64 MMHG | WEIGHT: 218 LBS | HEART RATE: 60 BPM | SYSTOLIC BLOOD PRESSURE: 138 MMHG | HEIGHT: 70 IN

## 2024-02-20 DIAGNOSIS — I10 HYPERTENSION, ESSENTIAL: ICD-10-CM

## 2024-02-20 DIAGNOSIS — Z79.899 LONG TERM CURRENT USE OF ANTIARRHYTHMIC MEDICAL THERAPY: ICD-10-CM

## 2024-02-20 DIAGNOSIS — I48.0 PAROXYSMAL ATRIAL FIBRILLATION: Primary | ICD-10-CM

## 2024-02-20 PROCEDURE — 3075F SYST BP GE 130 - 139MM HG: CPT | Performed by: PHYSICIAN ASSISTANT

## 2024-02-20 PROCEDURE — 99214 OFFICE O/P EST MOD 30 MIN: CPT | Performed by: PHYSICIAN ASSISTANT

## 2024-02-20 PROCEDURE — 3078F DIAST BP <80 MM HG: CPT | Performed by: PHYSICIAN ASSISTANT

## 2024-02-20 PROCEDURE — 93000 ELECTROCARDIOGRAM COMPLETE: CPT | Performed by: PHYSICIAN ASSISTANT

## 2024-02-20 RX ORDER — ALBUTEROL SULFATE 90 UG/1
1 AEROSOL, METERED RESPIRATORY (INHALATION) EVERY 4 HOURS PRN
COMMUNITY

## 2024-04-07 NOTE — ANESTHESIA PROCEDURE NOTES
Airway  Urgency: elective    Date/Time: 2/13/2023 8:49 AM  Airway not difficult    General Information and Staff    Patient location during procedure: OR    Indications and Patient Condition  Indications for airway management: airway protection    Preoxygenated: yes  MILS not maintained throughout  Mask difficulty assessment: 1 - vent by mask    Final Airway Details  Final airway type: endotracheal airway      Successful airway: ETT  Cuffed: yes   Successful intubation technique: direct laryngoscopy  Endotracheal tube insertion site: oral  Blade: Hallman  Blade size: 4  ETT size (mm): 7.5  Cormack-Lehane Classification: grade IIa - partial view of glottis  Placement verified by: chest auscultation and capnometry   Measured from: lips  ETT/EBT  to lips (cm): 23  Number of attempts at approach: 1  Assessment: lips, teeth, and gum same as pre-op and atraumatic intubation    Additional Comments  Negative epigastric sounds, Breath sound equal bilaterally with symmetric chest rise and fall         Normal vision: sees adequately in most situations; can see medication labels, newsprint

## 2024-04-11 ENCOUNTER — OFFICE VISIT (OUTPATIENT)
Dept: CARDIOLOGY | Facility: CLINIC | Age: 74
End: 2024-04-11
Payer: MEDICARE

## 2024-04-11 VITALS
OXYGEN SATURATION: 97 % | SYSTOLIC BLOOD PRESSURE: 148 MMHG | HEART RATE: 64 BPM | WEIGHT: 212 LBS | DIASTOLIC BLOOD PRESSURE: 76 MMHG | BODY MASS INDEX: 30.42 KG/M2

## 2024-04-11 DIAGNOSIS — I71.40 ABDOMINAL AORTIC ANEURYSM (AAA) WITHOUT RUPTURE, UNSPECIFIED PART: ICD-10-CM

## 2024-04-11 DIAGNOSIS — I10 HYPERTENSION, ESSENTIAL: ICD-10-CM

## 2024-04-11 DIAGNOSIS — I48.0 PAROXYSMAL ATRIAL FIBRILLATION: Primary | ICD-10-CM

## 2024-04-11 DIAGNOSIS — I25.10 CORONARY ARTERY DISEASE INVOLVING NATIVE CORONARY ARTERY OF NATIVE HEART, UNSPECIFIED WHETHER ANGINA PRESENT: ICD-10-CM

## 2024-04-11 PROCEDURE — 3078F DIAST BP <80 MM HG: CPT | Performed by: INTERNAL MEDICINE

## 2024-04-11 PROCEDURE — 3077F SYST BP >= 140 MM HG: CPT | Performed by: INTERNAL MEDICINE

## 2024-04-11 PROCEDURE — 93000 ELECTROCARDIOGRAM COMPLETE: CPT | Performed by: INTERNAL MEDICINE

## 2024-04-11 RX ORDER — ONDANSETRON 4 MG/1
TABLET, FILM COATED ORAL
COMMUNITY

## 2024-04-11 RX ORDER — LOSARTAN POTASSIUM 25 MG/1
TABLET ORAL
COMMUNITY
Start: 2024-04-10

## 2024-04-11 RX ORDER — SOTALOL HYDROCHLORIDE 80 MG/1
80 TABLET ORAL EVERY 12 HOURS
Qty: 90 TABLET | Refills: 3 | Status: SHIPPED | OUTPATIENT
Start: 2024-04-11

## 2024-04-11 RX ORDER — LORATADINE 10 MG/1
1 TABLET ORAL DAILY
COMMUNITY

## 2024-04-11 RX ORDER — ROSUVASTATIN CALCIUM 40 MG/1
40 TABLET, COATED ORAL DAILY
Qty: 90 TABLET | Refills: 3 | Status: SHIPPED | OUTPATIENT
Start: 2024-04-11

## 2024-04-11 NOTE — PROGRESS NOTES
[facility] Provider Note     Date:   2024   Name: Albino Rivera  :   1950  PCP: Linsey Jung MD    Chief Complaint   Patient presents with    Atrial Fibrillation     Study results       Subjective     History of Present Illness  Albino Rivera is a 73 y.o. male who presents today for follow up on CAD, a fib, AAA. He has been doing well and recently saw EP cardiology. He feels like he is doing well on sotalol and has had no break throughs. They offered him to come off the medication but he was reluctant because he has not had any problems. Since the long term side effects are minimal with this I don't have a problem with him continuing. He is also going to [place] for 6months which makes him want to stay with the medication more. Otherwise no chest pain, no shorntess of breath. Just issues with vertigo lately which he is working up with Dr. Jung. He also started new BP medication with Dr. Jung and has been checking it at home, it is usually in the 120s systolic there.     Cardiology/Sleep History  1. CAD  -Left heart catheterization and LAD stent, 40% circumflex artery stenosis, with noted  of small codominant RCA 2021 [facility]  2. Paroxysmal a fib   -diagnosed 2021, cardioversion in [place]   -PVI with RFA of LA flutter and mitral annulus and anterior LA wall 23 Siva  -recurrent post PVI a fib 2023 in [facility] started on sotalol  3. WILSON  -AHI 16 on HST 2022  -on CPAP  4. AAA 3.5cm last eval 3/4/22    Allergies   Allergen Reactions    Ace Inhibitors Cough       Current Outpatient Medications:     acetaminophen (TYLENOL) 500 MG tablet, Take 650 mg by mouth As Needed for Mild Pain., Disp: , Rfl:     albuterol sulfate  (90 Base) MCG/ACT inhaler, Inhale 1 puff Every 4 (Four) Hours As Needed., Disp: , Rfl:     aspirin 81 MG chewable tablet, Chew 1 tablet Daily., Disp: , Rfl:     Cetirizine HCl (ZyrTEC Allergy) 10  MG capsule, Take 10 mg by mouth Daily., Disp: , Rfl:     Eliquis 5 MG tablet tablet, Take 1 tablet by mouth 2 (Two) Times a Day., Disp: 180 tablet, Rfl: 3    Fluticasone-Umeclidin-Vilant (Trelegy Ellipta) 100-62.5-25 MCG/ACT inhaler, Daily., Disp: , Rfl:     loratadine (Claritin) 10 MG tablet, Take 1 tablet by mouth Daily., Disp: , Rfl:     losartan (COZAAR) 25 MG tablet, , Disp: , Rfl:     MAGnesium-Oxide 400 (240 Mg) MG tablet, Take 1 tablet by mouth Daily., Disp: , Rfl:     metoprolol tartrate (LOPRESSOR) 25 MG tablet, Take 1 tablet by mouth 2 (Two) Times a Day., Disp: 180 tablet, Rfl: 3    montelukast (SINGULAIR) 10 MG tablet, Take 1 tablet by mouth Daily., Disp: , Rfl:     Omeprazole Magnesium (PRILOSEC PO), Take 20 mg by mouth. 3 times a week, Disp: , Rfl:     ondansetron (ZOFRAN) 4 MG tablet, , Disp: , Rfl:     rosuvastatin (CRESTOR) 40 MG tablet, Take 1 tablet by mouth Daily., Disp: 90 tablet, Rfl: 3    sotalol (BETAPACE) 80 MG tablet, Take 1 tablet by mouth Every 12 (Twelve) Hours., Disp: 90 tablet, Rfl: 3    tamsulosin (FLOMAX) 0.4 MG capsule 24 hr capsule, Take 1 capsule by mouth every night at bedtime., Disp: , Rfl:     vitamin B-12 (CYANOCOBALAMIN) 1000 MCG tablet, Take 1 tablet by mouth Daily., Disp: , Rfl:     Past Medical History:   Diagnosis Date    A-fib     AAA (abdominal aortic aneurysm)     Abnormal echocardiogram     Abnormal heart rhythm     Acid reflux     Aneurysm 2019    Athscl heart disease of native coronary artery w/o ang pctrs     Bradycardia, unspecified     CAD (coronary artery disease)     S/P LAD STENT 2/21 AND  RCA    Cataracts, bilateral     COPD (chronic obstructive pulmonary disease)     Dislocated elbow     AGE 16    Double vision     SYMPTON, EPISODES OF DOUBLE VISION- RARE    Easy bruising     GERD (gastroesophageal reflux disease)     High cholesterol     History of cardioversion 10/2022    3X    History of stress test     Hypertension     Irregular heart beat     Kidney  stones     Other emphysema     Other hypersomnia     Other long term (current) drug therapy     Paroxysmal atrial fibrillation     Percutaneous transluminal coronary angioplasty (PTCA) within last 14 to 24 months     Precordial pain     Prostate disorder     PROBLEMS    Pure hypercholesterolemia, unspecified     Sleep apnea     pt states he recently had a sleep study and his results said he has sleep apnea    Supraventricular tachycardia     Wears glasses       Past Surgical History:   Procedure Laterality Date    APPENDECTOMY      CARDIAC CATHETERIZATION      CARDIAC ELECTROPHYSIOLOGY PROCEDURE N/A 2023    Procedure: Ablation atrial fibrillation. Hold Eliquis the morning of the procedure.;  Surgeon: Manjeet Paniagua MD;  Location: [facility] INVASIVE LOCATION;  Service: Cardiovascular;  Laterality: N/A;    CATARACT EXTRACTION Bilateral     COLONOSCOPY      CORONARY ANGIOPLASTY WITH STENT PLACEMENT      one stent    KIDNEY STONE SURGERY      [id number]    POLYPECTOMY      UMBILICAL HERNIA REPAIR       Family History   Problem Relation Age of Onset    Heart failure Father     Heart attack Father     Heart attack Brother     Prostate cancer Brother     Asthma Brother     Melanoma Brother     Atrial fibrillation Brother     Heart attack Brother      Social History     Socioeconomic History    Marital status:     Number of children: 2   Tobacco Use    Smoking status: Former     Current packs/day: 0.00     Average packs/day: 2.0 packs/day for 17.0 years (34.0 ttl pk-yrs)     Types: Cigarettes     Start date: 1968     Quit date: 1984     Years since quittin.3     Passive exposure: Past    Smokeless tobacco: Never   Vaping Use    Vaping status: Never Used   Substance and Sexual Activity    Alcohol use: Not Currently     Alcohol/week: 7.0 standard drinks of alcohol     Types: 7 Glasses of wine per week     Comment: a few drink a week    Drug use: Never    Sexual activity: Not Currently      "Partners: Female     Birth control/protection: Condom, Pill       Objective     Vital Signs:  /76   Pulse 64   Wt 96.2 kg (212 lb)   SpO2 97%   BMI 30.42 kg/m²   Estimated body mass index is 30.42 kg/m² as calculated from the following:    Height as of an earlier encounter on 4/11/24: 177.8 cm (70\").    Weight as of this encounter: 96.2 kg (212 lb).         Physical Exam  Vitals reviewed.   Constitutional:       General: He is not in acute distress.     Appearance: Normal appearance.   HENT:      Head: Normocephalic and atraumatic.      Mouth/Throat:      Mouth: Mucous membranes are moist.   Eyes:      Conjunctiva/sclera: Conjunctivae normal.   Neck:      Vascular: No carotid bruit.   Cardiovascular:      Rate and Rhythm: Normal rate and regular rhythm.      Pulses: Normal pulses.      Heart sounds: Normal heart sounds. No murmur heard.  Pulmonary:      Effort: Pulmonary effort is normal. No respiratory distress.      Breath sounds: Normal breath sounds. No wheezing or rhonchi.   Abdominal:      General: Abdomen is flat.      Palpations: Abdomen is soft.   Musculoskeletal:      Cervical back: Normal range of motion and neck supple.      Right lower leg: No edema.      Left lower leg: No edema.   Skin:     General: Skin is warm and dry.      Coloration: Skin is not jaundiced.   Neurological:      General: No focal deficit present.      Mental Status: He is alert and oriented to person, place, and time. Mental status is at baseline.      GCS: GCS eye subscore is 4. GCS verbal subscore is 5. GCS motor subscore is 6.      Cranial Nerves: No cranial nerve deficit.      Motor: No weakness.      Gait: Gait normal.   Psychiatric:         Mood and Affect: Mood and affect normal. Mood is not anxious.         Speech: Speech normal.         Behavior: Behavior normal.                 ECG 12 Lead    Date/Time: 4/11/2024 10:52 AM  Performed by: Aracelis Prince MD    Authorized by: Aracelis Prince MD  Comparison: " compared with previous ECG from 2/20/2024  Similar to previous ECG  Ectopy: infrequent PVCs  Rate: normal  Conduction: non-specific intraventricular conduction delay  ST Segments: ST segments normal  T inversion: V5, V6, II, III and aVF  QRS axis: normal    Clinical impression: abnormal EKG           Assessment and Plan     Diagnoses and all orders for this visit:    1. Paroxysmal atrial fibrillation (Primary)  Comments:  Doing well on sotalol, eliquis. Recent episodes. Follow up 6 months.  Orders:  -     ECG 12 Lead  -     metoprolol tartrate (LOPRESSOR) 25 MG tablet; Take 1 tablet by mouth 2 (Two) Times a Day.  Dispense: 180 tablet; Refill: 3  -     sotalol (BETAPACE) 80 MG tablet; Take 1 tablet by mouth Every 12 (Twelve) Hours.  Dispense: 90 tablet; Refill: 3    2. Coronary artery disease involving native coronary artery of native heart, unspecified whether angina present  Comments:  well controlled continue current medicaitons  Orders:  -     rosuvastatin (CRESTOR) 40 MG tablet; Take 1 tablet by mouth Daily.  Dispense: 90 tablet; Refill: 3    3. Abdominal aortic aneurysm (AAA) without rupture, unspecified part  Comments:  At the upper limits of normal today. No need to follow.    4. Hypertension, essential  Comments:  Better controlled at home and recently started new meds per PCP. Follow at home to ensure stability.        Recommendations: Report if any new/changing symptoms immediately      Follow Up  Return in about 6 months (around 10/11/2024) for Next scheduled follow up.    Aracelis Prince MD   04/11/2024     Please note that this explicitly excludes time spent on other separate billable services such as performing procedures or test interpretation, when applicable.    This note was created using dictation software which occasionally transcribes nonsensical phrases. Please contact the provider if any clarification is needed.

## 2024-10-07 DIAGNOSIS — I48.0 PAROXYSMAL ATRIAL FIBRILLATION: ICD-10-CM

## 2024-10-07 RX ORDER — SOTALOL HYDROCHLORIDE 80 MG/1
80 TABLET ORAL EVERY 12 HOURS
Qty: 90 TABLET | Refills: 3 | Status: SHIPPED | OUTPATIENT
Start: 2024-10-07

## 2024-10-07 NOTE — TELEPHONE ENCOUNTER
Patient called in needs a RX sent in for a 90 day to [facility] [id number] [place] [address].

## 2024-11-05 ENCOUNTER — OFFICE VISIT (OUTPATIENT)
Age: 74
End: 2024-11-05
Payer: MEDICARE

## 2024-11-05 VITALS
HEIGHT: 70 IN | BODY MASS INDEX: 30.86 KG/M2 | SYSTOLIC BLOOD PRESSURE: 120 MMHG | DIASTOLIC BLOOD PRESSURE: 78 MMHG | OXYGEN SATURATION: 96 % | HEART RATE: 63 BPM | WEIGHT: 215.6 LBS

## 2024-11-05 DIAGNOSIS — I25.10 CORONARY ARTERY DISEASE INVOLVING NATIVE CORONARY ARTERY OF NATIVE HEART, UNSPECIFIED WHETHER ANGINA PRESENT: ICD-10-CM

## 2024-11-05 DIAGNOSIS — I48.0 PAROXYSMAL ATRIAL FIBRILLATION: Primary | ICD-10-CM

## 2024-11-05 DIAGNOSIS — I10 HYPERTENSION, ESSENTIAL: ICD-10-CM

## 2024-11-05 PROCEDURE — 3074F SYST BP LT 130 MM HG: CPT | Performed by: INTERNAL MEDICINE

## 2024-11-05 PROCEDURE — 3078F DIAST BP <80 MM HG: CPT | Performed by: INTERNAL MEDICINE

## 2024-11-05 PROCEDURE — 93000 ELECTROCARDIOGRAM COMPLETE: CPT | Performed by: INTERNAL MEDICINE

## 2024-11-05 PROCEDURE — 99214 OFFICE O/P EST MOD 30 MIN: CPT | Performed by: INTERNAL MEDICINE

## 2024-11-05 NOTE — PROGRESS NOTES
[facility] Provider Note     Date:   2024   Name: Albino Rivera  :   1950  PCP: Linsey Jung MD    Chief Complaint   Patient presents with    Follow-up     6 mo AFIB fu       Subjective     History of Present Illness  Albino Rivera is a 74 y.o. male who presents today for follow up of a-fib  Has had 1 bout of a-fib on . 118 highest. [place] the palpitations and then did his mobile and it correlated. Lasted a couple hours, correct on its own. This is the first or second break through on sotalol, he checked rhythm on his home [employer] mobile. Discussed dropping sotalol in spring but d/t going away for summer left it on. Wants to see if it is a good time to talk abut it again.  Has had some shortness of breath related to COPD.  Has some reports of dizziness but relates that to vertigo. Is seeing Dr for this.  No syncope.  No swelling.  Sometimes has a tightness in chest but is not concerning to him. Usually when waking up in the morning. Very rare occasions.   Overall pt reports feeling good.        Cardiology/Sleep History  1. CAD  -Left heart catheterization and LAD stent, 40% circumflex artery stenosis, with noted  of small codominant RCA 2021 [facility]  2. Paroxysmal a fib   -diagnosed 2021, cardioversion in [place]   -PVI with RFA of LA flutter and mitral annulus and anterior LA wall 23 Siva  -recurrent post PVI a fib 2023 in [facility] started on sotalol  3. WILSON  -AHI 16 on HST 2022  -on CPAP  4. AAA 3.5cm last eval 3/4/22    Allergies   Allergen Reactions    Ace Inhibitors Cough       Current Outpatient Medications:     acetaminophen (TYLENOL) 500 MG tablet, Take 650 mg by mouth As Needed for Mild Pain., Disp: , Rfl:     albuterol sulfate  (90 Base) MCG/ACT inhaler, Inhale 1 puff Every 4 (Four) Hours As Needed., Disp: , Rfl:     aspirin 81 MG chewable tablet, Chew 1 tablet Daily., Disp: , Rfl:      Cetirizine HCl (ZyrTEC Allergy) 10 MG capsule, Take 10 mg by mouth Daily., Disp: , Rfl:     Eliquis 5 MG tablet tablet, Take 1 tablet by mouth 2 (Two) Times a Day., Disp: 180 tablet, Rfl: 3    Fluticasone-Umeclidin-Vilant (Trelegy Ellipta) 100-62.5-25 MCG/ACT inhaler, Daily., Disp: , Rfl:     loratadine (Claritin) 10 MG tablet, Take 1 tablet by mouth Daily., Disp: , Rfl:     losartan (COZAAR) 25 MG tablet, , Disp: , Rfl:     MAGnesium-Oxide 400 (240 Mg) MG tablet, Take 1 tablet by mouth Daily., Disp: , Rfl:     metoprolol tartrate (LOPRESSOR) 25 MG tablet, Take 1 tablet by mouth 2 (Two) Times a Day., Disp: 180 tablet, Rfl: 3    montelukast (SINGULAIR) 10 MG tablet, Take 1 tablet by mouth Daily., Disp: , Rfl:     Omeprazole Magnesium (PRILOSEC PO), Take 20 mg by mouth. 3 times a week, Disp: , Rfl:     ondansetron (ZOFRAN) 4 MG tablet, , Disp: , Rfl:     rosuvastatin (CRESTOR) 40 MG tablet, Take 1 tablet by mouth Daily., Disp: 90 tablet, Rfl: 3    sotalol (BETAPACE) 80 MG tablet, Take 1 tablet by mouth Every 12 (Twelve) Hours., Disp: 90 tablet, Rfl: 3    tamsulosin (FLOMAX) 0.4 MG capsule 24 hr capsule, Take 1 capsule by mouth every night at bedtime., Disp: , Rfl:     vitamin B-12 (CYANOCOBALAMIN) 1000 MCG tablet, Take 1 tablet by mouth Daily., Disp: , Rfl:     Past Medical History:   Diagnosis Date    A-fib     AAA (abdominal aortic aneurysm)     Abnormal echocardiogram     Abnormal heart rhythm     Acid reflux     Aneurysm 2019    Athscl heart disease of native coronary artery w/o ang pctrs     Bradycardia, unspecified     CAD (coronary artery disease)     S/P LAD STENT 2/21 AND  RCA    Cataracts, bilateral     COPD (chronic obstructive pulmonary disease)     Dislocated elbow     AGE 16    Double vision     SYMPTON, EPISODES OF DOUBLE VISION- RARE    Easy bruising     GERD (gastroesophageal reflux disease)     High cholesterol     History of cardioversion 10/2022    3X    History of stress test     Hypertension      Irregular heart beat     Kidney stones     Other emphysema     Other hypersomnia     Other long term (current) drug therapy     Paroxysmal atrial fibrillation     Percutaneous transluminal coronary angioplasty (PTCA) within last 14 to 24 months     Precordial pain     Prostate disorder     PROBLEMS    Pure hypercholesterolemia, unspecified     Sleep apnea     pt states he recently had a sleep study and his results said he has sleep apnea    Supraventricular tachycardia     UTI (urinary tract infection) 2024    Wears glasses       Past Surgical History:   Procedure Laterality Date    APPENDECTOMY      CARDIAC CATHETERIZATION      CARDIAC ELECTROPHYSIOLOGY PROCEDURE N/A 2023    Procedure: Ablation atrial fibrillation. Hold Eliquis the morning of the procedure.;  Surgeon: Manjeet Paniagua MD;  Location: [facility] INVASIVE LOCATION;  Service: Cardiovascular;  Laterality: N/A;    CATARACT EXTRACTION Bilateral     COLONOSCOPY      CORONARY ANGIOPLASTY WITH STENT PLACEMENT      one stent    KIDNEY STONE SURGERY      [id number]    POLYPECTOMY      UMBILICAL HERNIA REPAIR       Family History   Problem Relation Age of Onset    Heart failure Father     Heart attack Father     Heart attack Brother     Prostate cancer Brother     Asthma Brother     Melanoma Brother     Atrial fibrillation Brother     Heart attack Brother      Social History     Socioeconomic History    Marital status:     Number of children: 2   Tobacco Use    Smoking status: Former     Current packs/day: 0.00     Average packs/day: 2.0 packs/day for 17.0 years (34.0 ttl pk-yrs)     Types: Cigarettes     Start date: 1968     Quit date: 1984     Years since quittin.8     Passive exposure: Past    Smokeless tobacco: Never   Vaping Use    Vaping status: Never Used   Substance and Sexual Activity    Alcohol use: Not Currently     Alcohol/week: 7.0 standard drinks of alcohol     Types: 7 Glasses of wine per week     Comment: sidney  "few drink a week    Drug use: Never    Sexual activity: Not Currently     Partners: Female     Birth control/protection: Condom, Pill       Objective     Vital Signs:  /78 (BP Location: Right arm, Patient Position: Sitting, Cuff Size: Adult)   Pulse 63   Ht 177.8 cm (70\")   Wt 97.8 kg (215 lb 9.6 oz)   SpO2 96%   BMI 30.94 kg/m²   Estimated body mass index is 30.94 kg/m² as calculated from the following:    Height as of this encounter: 177.8 cm (70\").    Weight as of this encounter: 97.8 kg (215 lb 9.6 oz).         Physical Exam  Constitutional:       Appearance: Normal appearance. He is well-developed.   HENT:      Head: Normocephalic and atraumatic.   Eyes:      General: No scleral icterus.     Pupils: Pupils are equal, round, and reactive to light.   Cardiovascular:      Rate and Rhythm: Normal rate and regular rhythm.      Heart sounds: Normal heart sounds. No murmur heard.  Pulmonary:      Breath sounds: Normal breath sounds. No wheezing or rhonchi.   Musculoskeletal:      Right lower leg: No edema.      Left lower leg: No edema.   Skin:     Capillary Refill: Capillary refill takes less than 2 seconds.      Coloration: Skin is not cyanotic.      Nails: There is no clubbing.   Neurological:      Mental Status: He is alert and oriented to person, place, and time.      Motor: No weakness.      Gait: Gait normal.   Psychiatric:         Mood and Affect: Mood normal.         Behavior: Behavior is cooperative.         Thought Content: Thought content normal.         Cognition and Memory: Memory normal.                 ECG 12 Lead    Date/Time: 11/5/2024 1:39 PM  Performed by: Aracelis Prince MD    Authorized by: Aracelis Prince MD  Comparison: compared with previous ECG from 4/11/2024  Similar to previous ECG  Rhythm: sinus rhythm  Rate: normal  Conduction: non-specific intraventricular conduction delay  QRS axis: left  Other findings: non-specific ST-T wave changes    Clinical impression: abnormal " EKG           Assessment and Plan     Assessment & Plan  Paroxysmal atrial fibrillation  Isolated breakthrough episode.  Will check a monitor to make sure is not having other breakthroughs and follow-up.  Continue Eliquis.  Orders:    Adult Transthoracic Echo Complete W/ Cont if Necessary Per Protocol; Future    Holter Monitor - 72 Hour Up To 15 Days; Future    Coronary artery disease involving native coronary artery of native heart, unspecified whether angina present  Doing well.  Rare stable chest pain with no acceleration.  Continue medical therapy.         Hypertension, essential  Well-controlled.  Continue current medications.                      Follow Up  No follow-ups on file.    Phylicia Barroso RN   11/05/2024     Please note that this explicitly excludes time spent on other separate billable services such as performing procedures or test interpretation, when applicable.    This note was created using dictation software which occasionally transcribes nonsensical phrases. Please contact the provider if any clarification is needed.

## 2024-11-05 NOTE — ASSESSMENT & PLAN NOTE
Isolated breakthrough episode.  Will check a monitor to make sure is not having other breakthroughs and follow-up.  Continue Eliquis.  Orders:    Adult Transthoracic Echo Complete W/ Cont if Necessary Per Protocol; Future    Holter Monitor - 72 Hour Up To 15 Days; Future

## 2024-11-06 ENCOUNTER — PATIENT ROUNDING (BHMG ONLY) (OUTPATIENT)
Dept: CARDIOLOGY | Facility: CLINIC | Age: 74
End: 2024-11-06
Payer: MEDICARE

## 2024-11-06 NOTE — PROGRESS NOTES
..My name is Catherine Durham and I am the Practice Manager for [facility] [place].    I would like to thank you for being a loyal patient. If you do not mind I would like to ask you a few questions about your recent visit with us.  Please feel free to reply if you wish to provide us with feedback on your first visit with our practice.    First, could you tell me what went well with your recent visit?    Secondly, we are always looking for ways to make our patients' experiences even better.  Do you have any recommendations on ways we may improve?    Finally, overall were you satisfied with your first visit to us as a [facility] facility?    In the next few days, you will be receiving a Patient Experience Survey.      Thank you for taking the time to answer a few questions today.  I hope you have a good day.

## 2024-12-05 ENCOUNTER — HOSPITAL ENCOUNTER (OUTPATIENT)
Facility: HOSPITAL | Age: 74
Discharge: HOME | End: 2024-12-05
Admitting: INTERNAL MEDICINE
Payer: MEDICARE

## 2024-12-05 ENCOUNTER — OFFICE VISIT (OUTPATIENT)
Age: 74
End: 2024-12-05
Payer: MEDICARE

## 2024-12-05 VITALS — BODY MASS INDEX: 30.87 KG/M2 | HEIGHT: 70 IN | WEIGHT: 215.61 LBS

## 2024-12-05 VITALS
BODY MASS INDEX: 30.49 KG/M2 | DIASTOLIC BLOOD PRESSURE: 82 MMHG | SYSTOLIC BLOOD PRESSURE: 126 MMHG | OXYGEN SATURATION: 96 % | HEIGHT: 70 IN | HEART RATE: 59 BPM | WEIGHT: 213 LBS

## 2024-12-05 DIAGNOSIS — I10 HYPERTENSION, ESSENTIAL: ICD-10-CM

## 2024-12-05 DIAGNOSIS — I48.0 PAROXYSMAL ATRIAL FIBRILLATION: Primary | ICD-10-CM

## 2024-12-05 DIAGNOSIS — I25.10 CORONARY ARTERY DISEASE INVOLVING NATIVE CORONARY ARTERY OF NATIVE HEART, UNSPECIFIED WHETHER ANGINA PRESENT: ICD-10-CM

## 2024-12-05 DIAGNOSIS — I48.0 PAROXYSMAL ATRIAL FIBRILLATION: ICD-10-CM

## 2024-12-05 PROCEDURE — 3074F SYST BP LT 130 MM HG: CPT | Performed by: INTERNAL MEDICINE

## 2024-12-05 PROCEDURE — 93306 TTE W/DOPPLER COMPLETE: CPT | Performed by: INTERNAL MEDICINE

## 2024-12-05 PROCEDURE — 99214 OFFICE O/P EST MOD 30 MIN: CPT | Performed by: INTERNAL MEDICINE

## 2024-12-05 PROCEDURE — 3079F DIAST BP 80-89 MM HG: CPT | Performed by: INTERNAL MEDICINE

## 2024-12-05 PROCEDURE — 93306 TTE W/DOPPLER COMPLETE: CPT

## 2024-12-05 RX ORDER — LOSARTAN POTASSIUM 50 MG/1
50 TABLET ORAL DAILY
Qty: 90 TABLET | Refills: 3 | Status: SHIPPED | OUTPATIENT
Start: 2024-12-05

## 2024-12-05 NOTE — ASSESSMENT & PLAN NOTE
Home log reviewed. Several high BP episodes at home even though not reflected today. Will increase losartan and monitor.     Orders:    losartan (COZAAR) 50 MG tablet; Take 1 tablet by mouth Daily.

## 2024-12-05 NOTE — PROGRESS NOTES
[facility] Provider Note     Date:   2024   Name: Albino Rivera  :   1950  PCP: Linsey Jung MD    Chief Complaint   Patient presents with    Follow-up    Atrial Fibrillation       Subjective     History of Present Illness  Albino Rivera is a 74 y.o. male who presents today for follow up atrial fib.  Wore Holter monitor and did not feel much of anything, recorded an episode on the monitor but it wasn't as signficant to him. After monitor came off did have some palpitations. Has felt tightness with these episodes. Gets a little fuzzy in the head with but not any real dizziness otherwise.  Denies any syncope.  Reports shortness of air,reports COPD. Pt does report that he does not use albuterol because it raises heart rate.  Wears C-Pap and denies any problems with it or company.  No swelling noted.  Nothing out of the ordinary. Wants results.Using [employer]. Has a log with him today.      A fib episode significant over thanksgivings    Cardiology/Sleep History  1. CAD  -Left heart catheterization and LAD stent, 40% circumflex artery stenosis, with noted  of small codominant RCA 2021 [facility]  2. Paroxysmal a fib   -diagnosed 2021, cardioversion in [place]   -PVI with RFA of LA flutter and mitral annulus and anterior LA wall 23 Siva  -recurrent post PVI a fib 2023 in [facility] started on sotalol  3. WILSON  -AHI 16 on HST 2022  -on CPAP  4. AAA 3.5cm last eval 3/4/22    Allergies   Allergen Reactions    Ace Inhibitors Cough       Current Outpatient Medications:     acetaminophen (TYLENOL) 500 MG tablet, Take 650 mg by mouth As Needed for Mild Pain., Disp: , Rfl:     albuterol sulfate  (90 Base) MCG/ACT inhaler, Inhale 1 puff Every 4 (Four) Hours As Needed., Disp: , Rfl:     aspirin 81 MG chewable tablet, Chew 1 tablet Daily., Disp: , Rfl:     Cetirizine HCl (ZyrTEC Allergy) 10 MG capsule, Take 10 mg by mouth Daily., Disp:  , Rfl:     Eliquis 5 MG tablet tablet, Take 1 tablet by mouth 2 (Two) Times a Day., Disp: 180 tablet, Rfl: 3    Fluticasone-Umeclidin-Vilant (Trelegy Ellipta) 100-62.5-25 MCG/ACT inhaler, Daily., Disp: , Rfl:     loratadine (Claritin) 10 MG tablet, Take 1 tablet by mouth Daily., Disp: , Rfl:     losartan (COZAAR) 25 MG tablet, , Disp: , Rfl:     MAGnesium-Oxide 400 (240 Mg) MG tablet, Take 1 tablet by mouth Daily., Disp: , Rfl:     metoprolol tartrate (LOPRESSOR) 25 MG tablet, Take 1 tablet by mouth 2 (Two) Times a Day., Disp: 180 tablet, Rfl: 3    montelukast (SINGULAIR) 10 MG tablet, Take 1 tablet by mouth Daily., Disp: , Rfl:     Omeprazole Magnesium (PRILOSEC PO), Take 20 mg by mouth. 3 times a week, Disp: , Rfl:     ondansetron (ZOFRAN) 4 MG tablet, , Disp: , Rfl:     rosuvastatin (CRESTOR) 40 MG tablet, Take 1 tablet by mouth Daily., Disp: 90 tablet, Rfl: 3    sotalol (BETAPACE) 80 MG tablet, Take 1 tablet by mouth Every 12 (Twelve) Hours., Disp: 90 tablet, Rfl: 3    tamsulosin (FLOMAX) 0.4 MG capsule 24 hr capsule, Take 1 capsule by mouth every night at bedtime., Disp: , Rfl:     vitamin B-12 (CYANOCOBALAMIN) 1000 MCG tablet, Take 1 tablet by mouth Daily., Disp: , Rfl:     Past Medical History:   Diagnosis Date    A-fib     AAA (abdominal aortic aneurysm)     Abnormal echocardiogram     Abnormal heart rhythm     Acid reflux     Aneurysm 2019    Athscl heart disease of native coronary artery w/o ang pctrs     Bradycardia, unspecified     CAD (coronary artery disease)     S/P LAD STENT 2/21 AND  RCA    Cataracts, bilateral     COPD (chronic obstructive pulmonary disease)     Dislocated elbow     AGE 16    Double vision     SYMPTON, EPISODES OF DOUBLE VISION- RARE    Easy bruising     GERD (gastroesophageal reflux disease)     High cholesterol     History of cardioversion 10/2022    3X    History of stress test     Hypertension     Irregular heart beat     Kidney stones     Other emphysema     Other  hypersomnia     Other long term (current) drug therapy     Paroxysmal atrial fibrillation     Percutaneous transluminal coronary angioplasty (PTCA) within last 14 to 24 months     Precordial pain     Prostate disorder     PROBLEMS    Pure hypercholesterolemia, unspecified     Sleep apnea     pt states he recently had a sleep study and his results said he has sleep apnea    Supraventricular tachycardia     UTI (urinary tract infection) 2024    Wears glasses       Past Surgical History:   Procedure Laterality Date    APPENDECTOMY      CARDIAC CATHETERIZATION      CARDIAC ELECTROPHYSIOLOGY PROCEDURE N/A 2023    Procedure: Ablation atrial fibrillation. Hold Eliquis the morning of the procedure.;  Surgeon: Manjeet Paniagua MD;  Location: [facility] INVASIVE LOCATION;  Service: Cardiovascular;  Laterality: N/A;    CATARACT EXTRACTION Bilateral     COLONOSCOPY      CORONARY ANGIOPLASTY WITH STENT PLACEMENT      one stent    KIDNEY STONE SURGERY      [id number]    POLYPECTOMY      UMBILICAL HERNIA REPAIR  2019     Family History   Problem Relation Age of Onset    Heart failure Father     Heart attack Father     Heart attack Brother     Prostate cancer Brother     Asthma Brother     Melanoma Brother     Atrial fibrillation Brother     Heart attack Brother      Social History     Socioeconomic History    Marital status:     Number of children: 2   Tobacco Use    Smoking status: Former     Current packs/day: 0.00     Average packs/day: 2.0 packs/day for 17.0 years (34.0 ttl pk-yrs)     Types: Cigarettes     Start date: 1968     Quit date: 1984     Years since quittin.9     Passive exposure: Past    Smokeless tobacco: Never   Vaping Use    Vaping status: Never Used   Substance and Sexual Activity    Alcohol use: Not Currently     Alcohol/week: 7.0 standard drinks of alcohol     Types: 7 Glasses of wine per week     Comment: a few drink a week    Drug use: Never    Sexual activity: Not Currently      "Partners: Female     Birth control/protection: Condom, Pill       Objective     Vital Signs:  /82 (BP Location: Right arm, Patient Position: Sitting, Cuff Size: Adult)   Pulse 59   Ht 177.8 cm (70\")   Wt 96.6 kg (213 lb)   SpO2 96%   BMI 30.56 kg/m²   Estimated body mass index is 30.56 kg/m² as calculated from the following:    Height as of this encounter: 177.8 cm (70\").    Weight as of this encounter: 96.6 kg (213 lb).         Physical Exam  Constitutional:       Appearance: Normal appearance. He is well-developed.   HENT:      Head: Normocephalic and atraumatic.   Eyes:      General: No scleral icterus.     Pupils: Pupils are equal, round, and reactive to light.   Cardiovascular:      Rate and Rhythm: Normal rate and regular rhythm.      Heart sounds: Normal heart sounds. No murmur heard.  Pulmonary:      Breath sounds: Normal breath sounds. No wheezing or rhonchi.   Musculoskeletal:      Right lower leg: No edema.      Left lower leg: No edema.   Skin:     Capillary Refill: Capillary refill takes less than 2 seconds.      Coloration: Skin is not cyanotic.      Nails: There is no clubbing.   Neurological:      Mental Status: He is alert and oriented to person, place, and time.      Motor: No weakness.      Gait: Gait normal.   Psychiatric:         Mood and Affect: Mood normal.         Behavior: Behavior is cooperative.         Thought Content: Thought content normal.         Cognition and Memory: Memory normal.           Reviewed 5+ telemetry strips from patient's [facility] owen and independently interpreted them. Also communicated with Dr. Paniagua and forwarded a representative strip to him today.           Assessment and Plan     Assessment & Plan  Paroxysmal atrial fibrillation  EyeQuant reviewed and patient is having a fib with RVR. On OAC. On sotalol and cannot tolerate more because of >50% bradycardia on montor and average HR 58bpm. Discussed that this may lead to PM in future for " tachy-kamran but first would want to discuss re-do PVI with Dr. Paniagua. I will communicat with Dr. Paniagua and let patient know. Also reviewed last stress test which has been a few years, will consider wheterh or not to update and communicate with patient.   Orders:    Stress Test With Myocardial Perfusion One Day; Future    Coronary artery disease involving native coronary artery of native heart, unspecified whether angina present  Symptoms prior to last stent were excessive shortness of air. He has some chronic SOA with COPD now but does not feel like it has increased lately. Will consider stress test update because of higher arrhythmia burden and if needed before ablation.     Orders:    Stress Test With Myocardial Perfusion One Day; Future    Hypertension, essential   Home log reviewed. Several high BP episodes at home even though not reflected today. Will increase losartan and monitor.     Orders:    losartan (COZAAR) 50 MG tablet; Take 1 tablet by mouth Daily.                 Follow Up  No follow-ups on file.    Phylicia Barroso RN   12/05/2024     Please note that this explicitly excludes time spent on other separate billable services such as performing procedures or test interpretation, when applicable.    This note was created using dictation software which occasionally transcribes nonsensical phrases. Please contact the provider if any clarification is needed.

## 2024-12-05 NOTE — ASSESSMENT & PLAN NOTE
Symptoms prior to last stent were excessive shortness of air. He has some chronic SOA with COPD now but does not feel like it has increased lately. Will consider stress test update because of higher arrhythmia burden and if needed before ablation.     Orders:    Stress Test With Myocardial Perfusion One Day; Future

## 2024-12-05 NOTE — ASSESSMENT & PLAN NOTE
Angeles liang reviewed and patient is having a fib with RVR. On OAC. On sotalol and cannot tolerate more because of >50% bradycardia on montor and average HR 58bpm. Discussed that this may lead to PM in future for tachy-kamran but first would want to discuss re-do PVI with Dr. Paniagua. I will communicat with Dr. Paniagua and let patient know. Also reviewed last stress test which has been a few years, will consider wheterh or not to update and communicate with patient.   Orders:    Stress Test With Myocardial Perfusion One Day; Future

## 2024-12-09 LAB
ASCENDING AORTA: 3.7 CM
BH CV ECHO MEAS - AI P1/2T: 607.7 MSEC
BH CV ECHO MEAS - AO MAX PG: 6.6 MMHG
BH CV ECHO MEAS - AO MEAN PG: 4 MMHG
BH CV ECHO MEAS - AO ROOT DIAM: 3.7 CM
BH CV ECHO MEAS - AO V2 MAX: 128 CM/SEC
BH CV ECHO MEAS - AO V2 VTI: 33.7 CM
BH CV ECHO MEAS - AVA(I,D): 1.46 CM2
BH CV ECHO MEAS - EDV(CUBED): 216 ML
BH CV ECHO MEAS - EDV(MOD-SP2): 161 ML
BH CV ECHO MEAS - EDV(MOD-SP4): 206 ML
BH CV ECHO MEAS - EF(MOD-BP): 44.3 %
BH CV ECHO MEAS - EF(MOD-SP2): 42.5 %
BH CV ECHO MEAS - EF(MOD-SP4): 46.6 %
BH CV ECHO MEAS - ESV(CUBED): 85.2 ML
BH CV ECHO MEAS - ESV(MOD-SP2): 92.5 ML
BH CV ECHO MEAS - ESV(MOD-SP4): 110 ML
BH CV ECHO MEAS - FS: 26.7 %
BH CV ECHO MEAS - IVS/LVPW: 0.82 CM
BH CV ECHO MEAS - IVSD: 0.9 CM
BH CV ECHO MEAS - LA DIMENSION: 3.5 CM
BH CV ECHO MEAS - LAT PEAK E' VEL: 10.2 CM/SEC
BH CV ECHO MEAS - LV DIASTOLIC VOL/BSA (35-75): 96 CM2
BH CV ECHO MEAS - LV MASS(C)D: 246.9 GRAMS
BH CV ECHO MEAS - LV MAX PG: 1.56 MMHG
BH CV ECHO MEAS - LV MEAN PG: 1 MMHG
BH CV ECHO MEAS - LV SYSTOLIC VOL/BSA (12-30): 51.3 CM2
BH CV ECHO MEAS - LV V1 MAX: 62.5 CM/SEC
BH CV ECHO MEAS - LV V1 VTI: 17.4 CM
BH CV ECHO MEAS - LVIDD: 6 CM
BH CV ECHO MEAS - LVIDS: 4.4 CM
BH CV ECHO MEAS - LVOT AREA: 2.8 CM2
BH CV ECHO MEAS - LVOT DIAM: 1.9 CM
BH CV ECHO MEAS - LVPWD: 1.1 CM
BH CV ECHO MEAS - MED PEAK E' VEL: 7.1 CM/SEC
BH CV ECHO MEAS - MV DEC SLOPE: 295 CM/SEC2
BH CV ECHO MEAS - MV DEC TIME: 0.23 SEC
BH CV ECHO MEAS - MV E MAX VEL: 67 CM/SEC
BH CV ECHO MEAS - MV MAX PG: 2.5 MMHG
BH CV ECHO MEAS - MV MEAN PG: 1 MMHG
BH CV ECHO MEAS - MV V2 VTI: 25.9 CM
BH CV ECHO MEAS - MVA(VTI): 1.9 CM2
BH CV ECHO MEAS - PA V2 MAX: 70.7 CM/SEC
BH CV ECHO MEAS - PI END-D VEL: 82.1 CM/SEC
BH CV ECHO MEAS - SV(LVOT): 49.3 ML
BH CV ECHO MEAS - SV(MOD-SP2): 68.5 ML
BH CV ECHO MEAS - SV(MOD-SP4): 96 ML
BH CV ECHO MEAS - SVI(LVOT): 23 ML/M2
BH CV ECHO MEAS - SVI(MOD-SP2): 31.9 ML/M2
BH CV ECHO MEAS - SVI(MOD-SP4): 44.7 ML/M2
BH CV ECHO MEAS - TAPSE (>1.6): 1.98 CM
BH CV ECHO MEASUREMENTS AVERAGE E/E' RATIO: 7.75
BH CV VAS BP LEFT ARM: NORMAL MMHG
BH CV XLRA - RV BASE: 3.5 CM
BH CV XLRA - RV LENGTH: 9.7 CM
BH CV XLRA - RV MID: 2.4 CM
BH CV XLRA - TDI S': 11.3 CM/SEC
LEFT ATRIUM VOLUME INDEX: 29.3 ML/M2

## 2024-12-13 ENCOUNTER — TELEPHONE (OUTPATIENT)
Dept: CARDIOLOGY | Facility: CLINIC | Age: 74
End: 2024-12-13
Payer: MEDICARE

## 2024-12-13 DIAGNOSIS — I48.0 PAROXYSMAL ATRIAL FIBRILLATION: Primary | ICD-10-CM

## 2024-12-13 DIAGNOSIS — I49.3 PVC'S (PREMATURE VENTRICULAR CONTRACTIONS): ICD-10-CM

## 2024-12-13 NOTE — TELEPHONE ENCOUNTER
Dr. Paniagua would like the patient to have a PVA+/- PVC ablation. Hold Sotalol 5 days prior. DNS Eliquis.

## 2024-12-17 DIAGNOSIS — I48.0 PAROXYSMAL ATRIAL FIBRILLATION: ICD-10-CM

## 2024-12-17 RX ORDER — APIXABAN 5 MG/1
5 TABLET, FILM COATED ORAL 2 TIMES DAILY
Qty: 180 TABLET | Refills: 3 | Status: SHIPPED | OUTPATIENT
Start: 2024-12-17

## 2024-12-19 ENCOUNTER — TELEPHONE (OUTPATIENT)
Dept: CARDIOLOGY | Facility: CLINIC | Age: 74
End: 2024-12-19

## 2024-12-19 NOTE — TELEPHONE ENCOUNTER
Patient will be flying on commercial airline to [place], with a short lay over in [place]. Patient wants to make sure this will be safe as he will be 1 week post ablation.

## 2024-12-19 NOTE — TELEPHONE ENCOUNTER
Caller: Albino Rivera    Relationship: Self    Best call back number: [phone]    What is the best time to reach you: ANY    Who are you requesting to speak with (clinical staff, provider,  specific staff member): CLINICAL      What was the call regarding: PT HAS AN ABLATION SCHEDULED FOR 01.31.25 AND 1 WEEK AFTERWARDS WILL BE TAKING FLIGHT AND IS WONDERING IF THAT IS SAFE TO DO POST ABLATION. HE WILL BE TRAVELING TO [place] BY AIRPLANE AND IT WILL BE AROUND A 1.5 TO 2 HOUR FLIGHT.

## 2024-12-20 ENCOUNTER — TELEPHONE (OUTPATIENT)
Dept: CARDIOLOGY | Facility: CLINIC | Age: 74
End: 2024-12-20
Payer: MEDICARE

## 2024-12-20 NOTE — TELEPHONE ENCOUNTER
Patient is scheduled to have a re-do PVA+/-PVC ablation on 1-31-25.    He would like to know if it will be safe for him to fly to [place] one week after having the procedure done?

## 2025-01-09 NOTE — NURSING NOTE
PRE-PVA ASSESSMENT  Albino Rivera 1950   [address] [address]   [phone]     Referral Source: Aracelis Prince MD  Information obtained from: [x] Medical record review  [x] Patient report  Scheduled for: PVA on 01/31/2025 with Dr. Paniagua  Allergies        Allergies   Allergen Reactions    Ace Inhibitors Cough            AFib Specific History:  AFIBTYPE2: paroxysmal          CHADS-VASc Risk Assessment              2 Total Score     1 Hypertension     1 Age 65-74           Criteria that do not apply:     CHF     Age >/= 75     DM     PRIOR STROKE/TIA/THROMBO     Vascular Disease     Sex: Female          Anticoagulation: Eliquis 5 mg bid NO MISSED DOSES   Cardioversion x multiple  Failed AAD(s): sotalol  Prior Ablation: 02/2024 with Dr. Paniagua      Is Mr. Rivera aware of his AFib? Yes              Onset: 2021                            Exacerbations: none              Frequency: daily- weekly        Alleviations: none                      Duration: up to 4 hours                 Symptoms:              [x] Palpitations:                       [] Chest Discomfort:                [] Dizziness:                           [] Presyncope:               [] Lightheadedness:               [] Syncope:               [x] Fatigue:                              [] Other:               [x] Short of Breath:      Last Echo(s):  [x]    Results for orders placed during the hospital encounter of 12/05/24    Adult Transthoracic Echo Complete W/ Cont if Necessary Per Protocol    Interpretation Summary    Left ventricular systolic function is mildly decreased. Calculated left ventricular EF = 44.3%    Left ventricular diastolic function is consistent with (grade I) impaired relaxation.    There is calcification of the aortic valve mainly affecting the right coronary cusp(s).    Moderate aortic valve regurgitation is present.        Past medical History:             [] Diabetes     No                                                            Hemoglobin A1C   Date Value Ref Range Status   01/30/2023 5.4 <5.7 % Final   01/27/2022 5.6 <5.7 % Final                 [] HYPOthyroidism No  [] HYPERthyroidism No         [x] HTN                   [x] Tx metoprolol           [] Heart Failure  No                [] CVA   No                            [] TIA No              [x] CAD                                     [] MI No                                         [x] Dyslipidemia  [x] Statin indicated on crestor     [x] Ischemic Evaluation    Stress test- pending scheduling.     Left heart catheterization and LAD stent, 40% circumflex artery stenosis, with noted  of small codominant RCA February 2021 [facility]     [x] Sleep Apnea Diagnosed - CPAP, compliant      [x] Obesity       [x] BMI 30.42                 [x] Weight reduction discussed with Mr. Rivera                 Summary of Patient Contact:    I spoke with Mr. Rivera about his upcoming PVA.   He was well informed about the procedure from prior discussion with Dr. Paniagua and from reading the provided literature.  We discussed the procedure at length including risks, anesthesia, intra-op procedures, recovery, bedrest, normal post-procedure expectations, and success rates.  I answered a few remaining questions. Mr. Rivera verbalized understanding and he is ready to proceed.

## 2025-01-23 ENCOUNTER — TELEPHONE (OUTPATIENT)
Dept: CARDIOLOGY | Facility: CLINIC | Age: 75
End: 2025-01-23

## 2025-01-23 NOTE — TELEPHONE ENCOUNTER
Name: Albino Rivera    Relationship: Self    Best Callback Number: [phone]    Incoming call to the Hub, requesting to  Reschedule their HFU appointment on 07.29.25.     Per Hub workflow, further review is needed before the task can be completed.    Result of Call: Please reach out to the patient to reschedule

## 2025-01-24 ENCOUNTER — HOSPITAL ENCOUNTER (OUTPATIENT)
Dept: CT IMAGING | Facility: HOSPITAL | Age: 75
Discharge: HOME | End: 2025-01-24
Payer: MEDICARE

## 2025-01-24 ENCOUNTER — PRE-ADMISSION TESTING (OUTPATIENT)
Dept: PREADMISSION TESTING | Facility: HOSPITAL | Age: 75
End: 2025-01-24
Payer: MEDICARE

## 2025-01-24 ENCOUNTER — PREP FOR SURGERY (OUTPATIENT)
Dept: OTHER | Facility: HOSPITAL | Age: 75
End: 2025-01-24
Payer: MEDICARE

## 2025-01-24 DIAGNOSIS — I48.0 PAROXYSMAL ATRIAL FIBRILLATION: ICD-10-CM

## 2025-01-24 DIAGNOSIS — I48.91 ATRIAL FIBRILLATION, UNSPECIFIED TYPE: Primary | ICD-10-CM

## 2025-01-24 DIAGNOSIS — I49.3 PVC'S (PREMATURE VENTRICULAR CONTRACTIONS): ICD-10-CM

## 2025-01-24 DIAGNOSIS — I48.91 ATRIAL FIBRILLATION, UNSPECIFIED TYPE: ICD-10-CM

## 2025-01-24 LAB
ANION GAP SERPL CALCULATED.3IONS-SCNC: 6 MMOL/L (ref 5–15)
BUN SERPL-MCNC: 14 MG/DL (ref 8–23)
BUN/CREAT SERPL: 13.2 (ref 7–25)
CALCIUM SPEC-SCNC: 10 MG/DL (ref 8.6–10.5)
CHLORIDE SERPL-SCNC: 106 MMOL/L (ref 98–107)
CO2 SERPL-SCNC: 31 MMOL/L (ref 22–29)
CREAT SERPL-MCNC: 1.06 MG/DL (ref 0.76–1.27)
DEPRECATED RDW RBC AUTO: 42 FL (ref 37–54)
EGFRCR SERPLBLD CKD-EPI 2021: 73.2 ML/MIN/1.73
ERYTHROCYTE [DISTWIDTH] IN BLOOD BY AUTOMATED COUNT: 12.3 % (ref 12.3–15.4)
GLUCOSE SERPL-MCNC: 92 MG/DL (ref 65–99)
HCT VFR BLD AUTO: 45.5 % (ref 37.5–51)
HGB BLD-MCNC: 15.4 G/DL (ref 13–17.7)
MCH RBC QN AUTO: 31.3 PG (ref 26.6–33)
MCHC RBC AUTO-ENTMCNC: 33.8 G/DL (ref 31.5–35.7)
MCV RBC AUTO: 92.5 FL (ref 79–97)
PLATELET # BLD AUTO: 229 10*3/MM3 (ref 140–450)
PMV BLD AUTO: 9.7 FL (ref 6–12)
POTASSIUM SERPL-SCNC: 5 MMOL/L (ref 3.5–5.2)
RBC # BLD AUTO: 4.92 10*6/MM3 (ref 4.14–5.8)
SODIUM SERPL-SCNC: 143 MMOL/L (ref 136–145)
WBC NRBC COR # BLD AUTO: 6.09 10*3/MM3 (ref 3.4–10.8)

## 2025-01-24 PROCEDURE — 85027 COMPLETE CBC AUTOMATED: CPT

## 2025-01-24 PROCEDURE — 36415 COLL VENOUS BLD VENIPUNCTURE: CPT

## 2025-01-24 PROCEDURE — 71275 CT ANGIOGRAPHY CHEST: CPT

## 2025-01-24 PROCEDURE — 25510000001 IOPAMIDOL PER 1 ML: Performed by: STUDENT IN AN ORGANIZED HEALTH CARE EDUCATION/TRAINING PROGRAM

## 2025-01-24 PROCEDURE — 80048 BASIC METABOLIC PNL TOTAL CA: CPT

## 2025-01-24 RX ORDER — ONDANSETRON 2 MG/ML
4 INJECTION INTRAMUSCULAR; INTRAVENOUS EVERY 6 HOURS PRN
OUTPATIENT
Start: 2025-01-24

## 2025-01-24 RX ORDER — IOPAMIDOL 755 MG/ML
100 INJECTION, SOLUTION INTRAVASCULAR
Status: COMPLETED | OUTPATIENT
Start: 2025-01-24 | End: 2025-01-24

## 2025-01-24 RX ORDER — NITROGLYCERIN 0.4 MG/1
0.4 TABLET SUBLINGUAL
OUTPATIENT
Start: 2025-01-24

## 2025-01-24 RX ORDER — ACETAMINOPHEN 325 MG/1
650 TABLET ORAL EVERY 4 HOURS PRN
OUTPATIENT
Start: 2025-01-24

## 2025-01-24 RX ORDER — SODIUM CHLORIDE 9 MG/ML
40 INJECTION, SOLUTION INTRAVENOUS AS NEEDED
OUTPATIENT
Start: 2025-01-24

## 2025-01-24 RX ADMIN — IOPAMIDOL 80 ML: 755 INJECTION, SOLUTION INTRAVENOUS at 13:15

## 2025-01-24 NOTE — DISCHARGE INSTRUCTIONS
"Dear Patient,    Do NOT eat, drink, or smoke after midnight the night before your procedure.   Take your medications as instructed by your doctor.    Glasses and jewelry may be worn, but dentures must be removed prior to your procedure.    Leave any items you consider valuable at home.      MORNING of your Procedure, please bring the following:     -Photo ID and insurance card(s)    -ALL medications in their ORIGINAL CONTAINERS    -Co-pay and/or deductible required by your insurance   -Copy of living will or power of  document (if not brought to    Pre-Admission Testing department)   -CPAP mask and tubing, not your machine (if applicable)    -Relaxation aids (music, books, magazines)   -Skin Prep Instruction Sheet (if applicable)   -Relaxation Aids    Check in on the 2nd floor in the [address] [facility].  Your procedure will be performed in the cath lab or EP lab.  During your procedure, your family will wait in the cath lab waiting area where you checked in.      Need to make arrangements for transportation prior to discharge.    A handout regarding \"Heart Healthy Eating\" was provided today to encourage healthy eating habits.    Booklet published by [employer] was given in Pre-Admission testing.  This booklet is for informational purposes only.  If you have any questions about your procedure, please speak with your physician.      Please note:  If you are scheduled to have one of the following procedures: Pulmonary Vein Ablation, Lead Extraction, MitraClip, Cerebral Coilings or Embolization, please let your family know that after your procedure you will be going to recovery unit on the 2nd floor of the [facility].  When the physician is finished speaking with your family after your procedure is completed, your family will be directed or escorted to the surgery waiting area in the [facility].  This is where your family will wait until you are given a room assignment and then your family will be directed to the " appropriate unit.

## 2025-01-24 NOTE — PAT
PT AWARE OF MEDICATION STOPPAGE PER SURGEON OFFICE.     Patient directed to Radiology Department for Ct after Pre Admission Testing Appointment.

## 2025-01-30 ENCOUNTER — ANESTHESIA EVENT (OUTPATIENT)
Dept: CARDIOLOGY | Facility: HOSPITAL | Age: 75
End: 2025-01-30
Payer: MEDICARE

## 2025-01-30 NOTE — H&P
Pre-cardiac Ablation History and Physical  [facility]      Patient:  Albino Rivera  :  1950  MRN: [id number]    PCP:  Linsey Jung MD  PHONE:  [phone]    DATE: 2025  ID: Albino Rivera is a 74 y.o. male from [place]    CC: atrial fibrillation    Cardiac PMH: (Old records have been reviewed and summarized below)  Problem list:   PAF  Atrial fibrillation/flutter diagnosed on Holter monitor for 6 days revealing episodes of atrial tachycardia/flutter 2021  Atrial fibrillation with RVR requiring cardioversion procedure 2022 outside hospital in [place].  Symptomatic with palpitations  Chads Vascor equal to 3 chronic Eliquis therapy  Sotalol therapy ordered per local cardiologist 2022   S/p pulmonary vein ablation procedure and successful RFA of left-sided atrial flutter around the mitral valve annulus.  Additional ablation for triggers of atrial for been the anterior wall of the left atrium by Dr. Paniagua on 2023.  Recurrence  with subsequent initiation of sotalol  14d monitor 2024: 44/58/138 bpm, 52.2% bradycardia.  7 episodes of atrial tachycardia 2.6% PVCs  Coronary disease  Angina pectoris abnormal stress echo small codominant ardiogram with echo revealing apical septal hypokinesis   Left heart catheterization and LAD stent, 40% circumflex artery stenosis, with noted  of small codominant RCA 2021 [facility]  Echocardiogram 2021 EF 45 to 50% mild LVH, left atrium is mildly dilated moderate AI mild MR  TTE 2024: LVEF 44%, grade 1 diastolic dysfunction, moderate AI  COPD  Hyperlipidemia  Abdominal aortic aneurysm: Followed by Dr. Prince  WILSON-CPAP    BRIEF HPI:   Mr. Rivera is a 74-year-old male with the above medical history who presents for redo ablation.  He underwent pulmonary vein ablation in 2023 by Dr. Paniagua.  He had some recurrence after that so  "was started on sotalol.  He has not actually been seen in our office since February 2024 but is followed with Dr. Prince.  He has been experiencing some symptomatic bradycardia on sotalol.  Ambulatory monitor in November showed greater than 50% bradycardia, some atrial tachycardia but no atrial fibrillation.  This was discontinued in favor of proceeding with redo ablation.      Allergies:      Allergies   Allergen Reactions    Ace Inhibitors Cough       MEDICATIONS:  Current Outpatient Medications   Medication Instructions    acetaminophen (TYLENOL) 650 mg, As Needed    albuterol sulfate  (90 Base) MCG/ACT inhaler 1 puff, Every 4 Hours PRN    aspirin 81 mg, Daily    Eliquis 5 mg, Oral, 2 Times Daily    Fluticasone-Umeclidin-Vilant (Trelegy Ellipta) 100-62.5-25 MCG/ACT inhaler 1 puff, Daily    loratadine (Claritin) 10 MG tablet 1 tablet, Nightly    losartan (COZAAR) 50 mg, Oral, Daily    MAGnesium-Oxide 400 (240 Mg) MG tablet 1 tablet, Daily    metoprolol tartrate (LOPRESSOR) 25 mg, Oral, 2 Times Daily    montelukast (SINGULAIR) 10 mg, Daily    Omeprazole Magnesium (PRILOSEC PO) 20 mg    ondansetron (ZOFRAN) 4 MG tablet     rosuvastatin (CRESTOR) 40 mg, Oral, Daily    sotalol (BETAPACE) 80 mg, Oral, Every 12 Hours    tamsulosin (FLOMAX) 0.4 MG capsule 24 hr capsule 1 capsule, Every Night at Bedtime    vitamin B-12 (CYANOCOBALAMIN) 1,000 mcg, Daily    ZyrTEC Allergy 10 mg, As Needed       Past medical & surgical history, social and family history reviewed in the electronic medical record.    ROS: Pertinent positives listed in the HPI and problem list above. All others reviewed and negative.     Physical Exam:   BP (!) 192/99 (BP Location: Right arm)   Pulse 75   Temp 97.4 °F (36.3 °C) (Temporal)   Resp 14   Ht 177.8 cm (70\")   Wt 96.4 kg (212 lb 9.6 oz)   SpO2 98%   BMI 30.50 kg/m²     Constitutional:    Well-appearing 74 y.o. y/o adult  in no acute distress   Neck:     No Jugular venous distention    " Heart:    Regular rhythm and normal rate, no murmurs, rubs or gallops   Lungs:     Clear to auscultation bilaterally, no wheezes, rhales or rhonchi, nonlabored respirations   Abdomen:     Soft, nontender   Extremities:   No gross deformities, no edema, clubbing, or cyanosis.    Pulses:    Neuro:  Psych:   Radial and dorsalis pedis pulses palpable and equal bilaterally.  No gross focal deficits  Mood and behavior appropriate for situation       Labs and Diagnostic Data:  Results from last 7 days   Lab Units 01/24/25  1039   SODIUM mmol/L 143   POTASSIUM mmol/L 5.0   CHLORIDE mmol/L 106   CO2 mmol/L 31.0*   BUN mg/dL 14   CREATININE mg/dL 1.06   GLUCOSE mg/dL 92   CALCIUM mg/dL 10.0     Results from last 7 days   Lab Units 01/24/25  1039   WBC 10*3/mm3 6.09   HEMOGLOBIN g/dL 15.4   HEMATOCRIT % 45.5   PLATELETS 10*3/mm3 229     Lab Results   Component Value Date    TRIG 101 01/27/2022    HDL 65 01/27/2022    LDL 70.8 01/27/2022    AST 17 (L) 01/27/2022    ALT 26 01/27/2022                       IMPRESSION:  Mr. Rivera is a 75 y/o male with history of atrial fibrillation dilated cardiomyopathy with previous pulmonary vein isolation, left-sided atrial flutter anterior to the mitral valve annulus, and anterior wall of the left atrium ablations in 2023 who has since had recurrence and was started on sotalol.  He now is having bradycardia on sotalol and presents for pulmonary vein ablation  Last dose sotalol 6 days ago  Anticoagulation continue    PLAN:  Procedure to perform: Redo PVI. Risks, benefits and alternatives to the procedure explained to the patient and he understands and wishes to proceed.     Partially scribed by Arnulfo Jeronimo PA-C for Dr. Manjeet Paniagua MD    I, Manjeet Paniagua MD, personally performed the services described in this documentation as scribed by the above named individual in my presence, and it is both accurate and complete.  1/31/2025  07:32 EST

## 2025-01-31 ENCOUNTER — HOSPITAL ENCOUNTER (OUTPATIENT)
Facility: HOSPITAL | Age: 75
Setting detail: HOSPITAL OUTPATIENT SURGERY
Discharge: HOME | End: 2025-01-31
Attending: STUDENT IN AN ORGANIZED HEALTH CARE EDUCATION/TRAINING PROGRAM | Admitting: STUDENT IN AN ORGANIZED HEALTH CARE EDUCATION/TRAINING PROGRAM
Payer: MEDICARE

## 2025-01-31 ENCOUNTER — ANESTHESIA (OUTPATIENT)
Dept: CARDIOLOGY | Facility: HOSPITAL | Age: 75
End: 2025-01-31
Payer: MEDICARE

## 2025-01-31 VITALS
TEMPERATURE: 98 F | DIASTOLIC BLOOD PRESSURE: 71 MMHG | BODY MASS INDEX: 30.43 KG/M2 | HEIGHT: 70 IN | WEIGHT: 212.6 LBS | HEART RATE: 79 BPM | RESPIRATION RATE: 10 BRPM | SYSTOLIC BLOOD PRESSURE: 145 MMHG | OXYGEN SATURATION: 91 %

## 2025-01-31 DIAGNOSIS — I49.3 PVC'S (PREMATURE VENTRICULAR CONTRACTIONS): ICD-10-CM

## 2025-01-31 DIAGNOSIS — I48.0 PAROXYSMAL ATRIAL FIBRILLATION: ICD-10-CM

## 2025-01-31 LAB
ACT BLD: 354 SECONDS (ref 82–152)
ACT BLD: 360 SECONDS (ref 82–152)
ACT BLD: 360 SECONDS (ref 82–152)
ACT BLD: 389 SECONDS (ref 82–152)
ACT BLD: 417 SECONDS (ref 82–152)
ACT BLD: 441 SECONDS (ref 82–152)
ACT BLD: 481 SECONDS (ref 82–152)

## 2025-01-31 PROCEDURE — 93622 COMP EP EVAL L VENTR PAC&REC: CPT | Performed by: STUDENT IN AN ORGANIZED HEALTH CARE EDUCATION/TRAINING PROGRAM

## 2025-01-31 PROCEDURE — 25010000002 LIDOCAINE PF 1% 1 % SOLUTION

## 2025-01-31 PROCEDURE — 25010000002 HEPARIN (PORCINE) PER 1000 UNITS: Performed by: STUDENT IN AN ORGANIZED HEALTH CARE EDUCATION/TRAINING PROGRAM

## 2025-01-31 PROCEDURE — 25810000003 SODIUM CHLORIDE 0.9 % SOLUTION: Performed by: STUDENT IN AN ORGANIZED HEALTH CARE EDUCATION/TRAINING PROGRAM

## 2025-01-31 PROCEDURE — C1730 CATH, EP, 19 OR FEW ELECT: HCPCS | Performed by: STUDENT IN AN ORGANIZED HEALTH CARE EDUCATION/TRAINING PROGRAM

## 2025-01-31 PROCEDURE — 93657 TX L/R ATRIAL FIB ADDL: CPT | Performed by: STUDENT IN AN ORGANIZED HEALTH CARE EDUCATION/TRAINING PROGRAM

## 2025-01-31 PROCEDURE — 93656 COMPRE EP EVAL ABLTJ ATR FIB: CPT | Performed by: STUDENT IN AN ORGANIZED HEALTH CARE EDUCATION/TRAINING PROGRAM

## 2025-01-31 PROCEDURE — 25010000002 PHENYLEPHRINE 10 MG/ML SOLUTION 1 ML VIAL

## 2025-01-31 PROCEDURE — C1894 INTRO/SHEATH, NON-LASER: HCPCS | Performed by: STUDENT IN AN ORGANIZED HEALTH CARE EDUCATION/TRAINING PROGRAM

## 2025-01-31 PROCEDURE — 0897T N-INVAS AUGMNT ARRHYT ALYS: CPT | Performed by: STUDENT IN AN ORGANIZED HEALTH CARE EDUCATION/TRAINING PROGRAM

## 2025-01-31 PROCEDURE — C1759 CATH, INTRA ECHOCARDIOGRAPHY: HCPCS | Performed by: STUDENT IN AN ORGANIZED HEALTH CARE EDUCATION/TRAINING PROGRAM

## 2025-01-31 PROCEDURE — C1732 CATH, EP, DIAG/ABL, 3D/VECT: HCPCS | Performed by: STUDENT IN AN ORGANIZED HEALTH CARE EDUCATION/TRAINING PROGRAM

## 2025-01-31 PROCEDURE — C1893 INTRO/SHEATH, FIXED,NON-PEEL: HCPCS | Performed by: STUDENT IN AN ORGANIZED HEALTH CARE EDUCATION/TRAINING PROGRAM

## 2025-01-31 PROCEDURE — 93655 ICAR CATH ABLTJ DSCRT ARRHYT: CPT | Performed by: STUDENT IN AN ORGANIZED HEALTH CARE EDUCATION/TRAINING PROGRAM

## 2025-01-31 PROCEDURE — 25810000003 SODIUM CHLORIDE 0.9 % SOLUTION

## 2025-01-31 PROCEDURE — 85347 COAGULATION TIME ACTIVATED: CPT

## 2025-01-31 PROCEDURE — 25010000002 ONDANSETRON PER 1 MG

## 2025-01-31 PROCEDURE — 93623 PRGRMD STIMJ&PACG IV RX NFS: CPT | Performed by: STUDENT IN AN ORGANIZED HEALTH CARE EDUCATION/TRAINING PROGRAM

## 2025-01-31 PROCEDURE — C1766 INTRO/SHEATH,STRBLE,NON-PEEL: HCPCS | Performed by: STUDENT IN AN ORGANIZED HEALTH CARE EDUCATION/TRAINING PROGRAM

## 2025-01-31 PROCEDURE — C1760 CLOSURE DEV, VASC: HCPCS | Performed by: STUDENT IN AN ORGANIZED HEALTH CARE EDUCATION/TRAINING PROGRAM

## 2025-01-31 PROCEDURE — 25010000002 ESMOLOL 100 MG/10ML SOLUTION

## 2025-01-31 PROCEDURE — 25010000002 PROTAMINE SULFATE PER 10 MG: Performed by: STUDENT IN AN ORGANIZED HEALTH CARE EDUCATION/TRAINING PROGRAM

## 2025-01-31 PROCEDURE — 25010000002 PROPOFOL 10 MG/ML EMULSION

## 2025-01-31 PROCEDURE — 25810000003 SODIUM CHLORIDE 0.9 % SOLUTION 250 ML FLEX CONT

## 2025-01-31 PROCEDURE — 25010000002 DEXAMETHASONE PER 1 MG

## 2025-01-31 PROCEDURE — 25010000002 SUGAMMADEX 200 MG/2ML SOLUTION

## 2025-01-31 RX ORDER — SODIUM CHLORIDE 9 MG/ML
40 INJECTION, SOLUTION INTRAVENOUS AS NEEDED
Status: DISCONTINUED | OUTPATIENT
Start: 2025-01-31 | End: 2025-01-31 | Stop reason: HOSPADM

## 2025-01-31 RX ORDER — SODIUM CHLORIDE 0.9 % (FLUSH) 0.9 %
10 SYRINGE (ML) INJECTION AS NEEDED
Status: DISCONTINUED | OUTPATIENT
Start: 2025-01-31 | End: 2025-01-31 | Stop reason: HOSPADM

## 2025-01-31 RX ORDER — ESMOLOL HYDROCHLORIDE 10 MG/ML
INJECTION INTRAVENOUS AS NEEDED
Status: DISCONTINUED | OUTPATIENT
Start: 2025-01-31 | End: 2025-01-31 | Stop reason: SURG

## 2025-01-31 RX ORDER — PROTAMINE SULFATE 10 MG/ML
INJECTION, SOLUTION INTRAVENOUS
Status: DISCONTINUED | OUTPATIENT
Start: 2025-01-31 | End: 2025-01-31 | Stop reason: HOSPADM

## 2025-01-31 RX ORDER — MEPERIDINE HYDROCHLORIDE 25 MG/ML
12.5 INJECTION INTRAMUSCULAR; INTRAVENOUS; SUBCUTANEOUS
Status: DISCONTINUED | OUTPATIENT
Start: 2025-01-31 | End: 2025-01-31 | Stop reason: HOSPADM

## 2025-01-31 RX ORDER — PROMETHAZINE HYDROCHLORIDE 25 MG/1
25 SUPPOSITORY RECTAL ONCE AS NEEDED
Status: DISCONTINUED | OUTPATIENT
Start: 2025-01-31 | End: 2025-01-31 | Stop reason: HOSPADM

## 2025-01-31 RX ORDER — ROCURONIUM BROMIDE 10 MG/ML
INJECTION, SOLUTION INTRAVENOUS AS NEEDED
Status: DISCONTINUED | OUTPATIENT
Start: 2025-01-31 | End: 2025-01-31 | Stop reason: SURG

## 2025-01-31 RX ORDER — IPRATROPIUM BROMIDE AND ALBUTEROL SULFATE 2.5; .5 MG/3ML; MG/3ML
3 SOLUTION RESPIRATORY (INHALATION) ONCE AS NEEDED
Status: DISCONTINUED | OUTPATIENT
Start: 2025-01-31 | End: 2025-01-31 | Stop reason: HOSPADM

## 2025-01-31 RX ORDER — NALOXONE HCL 0.4 MG/ML
0.4 VIAL (ML) INJECTION AS NEEDED
Status: DISCONTINUED | OUTPATIENT
Start: 2025-01-31 | End: 2025-01-31 | Stop reason: HOSPADM

## 2025-01-31 RX ORDER — MIDAZOLAM HYDROCHLORIDE 1 MG/ML
0.5 INJECTION, SOLUTION INTRAMUSCULAR; INTRAVENOUS
Status: DISCONTINUED | OUTPATIENT
Start: 2025-01-31 | End: 2025-01-31 | Stop reason: HOSPADM

## 2025-01-31 RX ORDER — SODIUM CHLORIDE 0.9 % (FLUSH) 0.9 %
3 SYRINGE (ML) INJECTION EVERY 12 HOURS SCHEDULED
Status: DISCONTINUED | OUTPATIENT
Start: 2025-01-31 | End: 2025-01-31 | Stop reason: HOSPADM

## 2025-01-31 RX ORDER — FENTANYL CITRATE 50 UG/ML
50 INJECTION, SOLUTION INTRAMUSCULAR; INTRAVENOUS
Status: DISCONTINUED | OUTPATIENT
Start: 2025-01-31 | End: 2025-01-31 | Stop reason: HOSPADM

## 2025-01-31 RX ORDER — SODIUM CHLORIDE 0.9 % (FLUSH) 0.9 %
3-10 SYRINGE (ML) INJECTION AS NEEDED
Status: DISCONTINUED | OUTPATIENT
Start: 2025-01-31 | End: 2025-01-31 | Stop reason: HOSPADM

## 2025-01-31 RX ORDER — ONDANSETRON 2 MG/ML
INJECTION INTRAMUSCULAR; INTRAVENOUS AS NEEDED
Status: DISCONTINUED | OUTPATIENT
Start: 2025-01-31 | End: 2025-01-31 | Stop reason: SURG

## 2025-01-31 RX ORDER — HYDRALAZINE HYDROCHLORIDE 20 MG/ML
5 INJECTION INTRAMUSCULAR; INTRAVENOUS
Status: DISCONTINUED | OUTPATIENT
Start: 2025-01-31 | End: 2025-01-31 | Stop reason: HOSPADM

## 2025-01-31 RX ORDER — ONDANSETRON 2 MG/ML
4 INJECTION INTRAMUSCULAR; INTRAVENOUS EVERY 6 HOURS PRN
Status: DISCONTINUED | OUTPATIENT
Start: 2025-01-31 | End: 2025-01-31 | Stop reason: HOSPADM

## 2025-01-31 RX ORDER — HEPARIN SODIUM 1000 [USP'U]/ML
INJECTION, SOLUTION INTRAVENOUS; SUBCUTANEOUS
Status: DISCONTINUED | OUTPATIENT
Start: 2025-01-31 | End: 2025-01-31 | Stop reason: HOSPADM

## 2025-01-31 RX ORDER — LIDOCAINE HYDROCHLORIDE 10 MG/ML
INJECTION, SOLUTION EPIDURAL; INFILTRATION; INTRACAUDAL; PERINEURAL AS NEEDED
Status: DISCONTINUED | OUTPATIENT
Start: 2025-01-31 | End: 2025-01-31 | Stop reason: SURG

## 2025-01-31 RX ORDER — SODIUM CHLORIDE 9 MG/ML
9 INJECTION, SOLUTION INTRAVENOUS AS NEEDED
Status: DISCONTINUED | OUTPATIENT
Start: 2025-01-31 | End: 2025-01-31 | Stop reason: HOSPADM

## 2025-01-31 RX ORDER — LIDOCAINE HYDROCHLORIDE 10 MG/ML
0.5 INJECTION, SOLUTION EPIDURAL; INFILTRATION; INTRACAUDAL; PERINEURAL ONCE AS NEEDED
Status: DISCONTINUED | OUTPATIENT
Start: 2025-01-31 | End: 2025-01-31 | Stop reason: HOSPADM

## 2025-01-31 RX ORDER — SODIUM CHLORIDE 0.9 % (FLUSH) 0.9 %
10 SYRINGE (ML) INJECTION EVERY 12 HOURS SCHEDULED
Status: DISCONTINUED | OUTPATIENT
Start: 2025-01-31 | End: 2025-01-31 | Stop reason: HOSPADM

## 2025-01-31 RX ORDER — ACETAMINOPHEN 325 MG/1
650 TABLET ORAL EVERY 4 HOURS PRN
Status: DISCONTINUED | OUTPATIENT
Start: 2025-01-31 | End: 2025-01-31 | Stop reason: HOSPADM

## 2025-01-31 RX ORDER — PROMETHAZINE HYDROCHLORIDE 25 MG/1
25 TABLET ORAL ONCE AS NEEDED
Status: DISCONTINUED | OUTPATIENT
Start: 2025-01-31 | End: 2025-01-31 | Stop reason: HOSPADM

## 2025-01-31 RX ORDER — LABETALOL HYDROCHLORIDE 5 MG/ML
5 INJECTION, SOLUTION INTRAVENOUS
Status: DISCONTINUED | OUTPATIENT
Start: 2025-01-31 | End: 2025-01-31 | Stop reason: HOSPADM

## 2025-01-31 RX ORDER — PROPOFOL 10 MG/ML
VIAL (ML) INTRAVENOUS AS NEEDED
Status: DISCONTINUED | OUTPATIENT
Start: 2025-01-31 | End: 2025-01-31 | Stop reason: SURG

## 2025-01-31 RX ORDER — HEPARIN SODIUM 10000 [USP'U]/100ML
INJECTION, SOLUTION INTRAVENOUS
Status: DISCONTINUED | OUTPATIENT
Start: 2025-01-31 | End: 2025-01-31 | Stop reason: HOSPADM

## 2025-01-31 RX ORDER — OXYCODONE AND ACETAMINOPHEN 7.5; 325 MG/1; MG/1
1 TABLET ORAL EVERY 4 HOURS PRN
Status: DISCONTINUED | OUTPATIENT
Start: 2025-01-31 | End: 2025-01-31 | Stop reason: HOSPADM

## 2025-01-31 RX ORDER — FAMOTIDINE 20 MG/1
20 TABLET, FILM COATED ORAL ONCE
Status: DISCONTINUED | OUTPATIENT
Start: 2025-01-31 | End: 2025-01-31 | Stop reason: HOSPADM

## 2025-01-31 RX ORDER — FAMOTIDINE 10 MG/ML
20 INJECTION, SOLUTION INTRAVENOUS ONCE
Status: COMPLETED | OUTPATIENT
Start: 2025-01-31 | End: 2025-01-31

## 2025-01-31 RX ORDER — SODIUM CHLORIDE 9 MG/ML
INJECTION, SOLUTION INTRAVENOUS
Status: DISCONTINUED | OUTPATIENT
Start: 2025-01-31 | End: 2025-01-31 | Stop reason: HOSPADM

## 2025-01-31 RX ORDER — BUPIVACAINE HCL/0.9 % NACL/PF 0.125 %
PLASTIC BAG, INJECTION (ML) EPIDURAL AS NEEDED
Status: DISCONTINUED | OUTPATIENT
Start: 2025-01-31 | End: 2025-01-31 | Stop reason: SURG

## 2025-01-31 RX ORDER — HYDROMORPHONE HYDROCHLORIDE 1 MG/ML
0.5 INJECTION, SOLUTION INTRAMUSCULAR; INTRAVENOUS; SUBCUTANEOUS
Status: DISCONTINUED | OUTPATIENT
Start: 2025-01-31 | End: 2025-01-31 | Stop reason: HOSPADM

## 2025-01-31 RX ORDER — SODIUM CHLORIDE 9 MG/ML
INJECTION, SOLUTION INTRAVENOUS CONTINUOUS PRN
Status: DISCONTINUED | OUTPATIENT
Start: 2025-01-31 | End: 2025-01-31 | Stop reason: SURG

## 2025-01-31 RX ORDER — HYDROCODONE BITARTRATE AND ACETAMINOPHEN 5; 325 MG/1; MG/1
1 TABLET ORAL ONCE AS NEEDED
Status: DISCONTINUED | OUTPATIENT
Start: 2025-01-31 | End: 2025-01-31 | Stop reason: HOSPADM

## 2025-01-31 RX ORDER — ONDANSETRON 2 MG/ML
4 INJECTION INTRAMUSCULAR; INTRAVENOUS ONCE AS NEEDED
Status: DISCONTINUED | OUTPATIENT
Start: 2025-01-31 | End: 2025-01-31 | Stop reason: HOSPADM

## 2025-01-31 RX ORDER — NITROGLYCERIN 0.4 MG/1
0.4 TABLET SUBLINGUAL
Status: DISCONTINUED | OUTPATIENT
Start: 2025-01-31 | End: 2025-01-31 | Stop reason: HOSPADM

## 2025-01-31 RX ORDER — ACETAMINOPHEN 650 MG/1
650 SUPPOSITORY RECTAL EVERY 4 HOURS PRN
Status: DISCONTINUED | OUTPATIENT
Start: 2025-01-31 | End: 2025-01-31 | Stop reason: HOSPADM

## 2025-01-31 RX ORDER — SODIUM CHLORIDE, SODIUM LACTATE, POTASSIUM CHLORIDE, CALCIUM CHLORIDE 600; 310; 30; 20 MG/100ML; MG/100ML; MG/100ML; MG/100ML
9 INJECTION, SOLUTION INTRAVENOUS CONTINUOUS
Status: DISCONTINUED | OUTPATIENT
Start: 2025-01-31 | End: 2025-01-31 | Stop reason: HOSPADM

## 2025-01-31 RX ORDER — DEXAMETHASONE SODIUM PHOSPHATE 4 MG/ML
INJECTION, SOLUTION INTRA-ARTICULAR; INTRALESIONAL; INTRAMUSCULAR; INTRAVENOUS; SOFT TISSUE AS NEEDED
Status: DISCONTINUED | OUTPATIENT
Start: 2025-01-31 | End: 2025-01-31 | Stop reason: SURG

## 2025-01-31 RX ORDER — SODIUM CHLORIDE, SODIUM LACTATE, POTASSIUM CHLORIDE, CALCIUM CHLORIDE 600; 310; 30; 20 MG/100ML; MG/100ML; MG/100ML; MG/100ML
9 INJECTION, SOLUTION INTRAVENOUS CONTINUOUS
Status: DISCONTINUED | OUTPATIENT
Start: 2025-02-01 | End: 2025-01-31 | Stop reason: HOSPADM

## 2025-01-31 RX ADMIN — ROCURONIUM BROMIDE 20 MG: 10 INJECTION INTRAVENOUS at 10:51

## 2025-01-31 RX ADMIN — PROPOFOL 200 MG: 10 INJECTION, EMULSION INTRAVENOUS at 08:07

## 2025-01-31 RX ADMIN — FAMOTIDINE 20 MG: 10 INJECTION, SOLUTION INTRAVENOUS at 06:56

## 2025-01-31 RX ADMIN — ROCURONIUM BROMIDE 10 MG: 10 INJECTION INTRAVENOUS at 11:43

## 2025-01-31 RX ADMIN — ESMOLOL HYDROCHLORIDE 20 MG: 10 INJECTION, SOLUTION INTRAVENOUS at 08:12

## 2025-01-31 RX ADMIN — DEXAMETHASONE SODIUM PHOSPHATE 4 MG: 4 INJECTION, SOLUTION INTRAMUSCULAR; INTRAVENOUS at 08:16

## 2025-01-31 RX ADMIN — SUGAMMADEX 300 MG: 100 INJECTION, SOLUTION INTRAVENOUS at 11:57

## 2025-01-31 RX ADMIN — Medication 100 MCG: at 08:33

## 2025-01-31 RX ADMIN — ESMOLOL HYDROCHLORIDE 10 MG: 10 INJECTION, SOLUTION INTRAVENOUS at 08:07

## 2025-01-31 RX ADMIN — LIDOCAINE HYDROCHLORIDE 50 MG: 10 INJECTION, SOLUTION EPIDURAL; INFILTRATION; INTRACAUDAL; PERINEURAL at 08:07

## 2025-01-31 RX ADMIN — Medication 100 MCG: at 10:26

## 2025-01-31 RX ADMIN — Medication 100 MCG: at 08:32

## 2025-01-31 RX ADMIN — Medication 100 MCG: at 11:38

## 2025-01-31 RX ADMIN — ROCURONIUM BROMIDE 60 MG: 10 INJECTION INTRAVENOUS at 08:07

## 2025-01-31 RX ADMIN — Medication 200 MCG: at 10:34

## 2025-01-31 RX ADMIN — Medication 100 MCG: at 10:17

## 2025-01-31 RX ADMIN — ESMOLOL HYDROCHLORIDE 20 MG: 10 INJECTION, SOLUTION INTRAVENOUS at 08:17

## 2025-01-31 RX ADMIN — PHENYLEPHRINE HYDROCHLORIDE 0.5 MCG/KG/MIN: 10 INJECTION INTRAVENOUS at 08:40

## 2025-01-31 RX ADMIN — ONDANSETRON 4 MG: 2 INJECTION INTRAMUSCULAR; INTRAVENOUS at 11:48

## 2025-01-31 RX ADMIN — ROCURONIUM BROMIDE 20 MG: 10 INJECTION INTRAVENOUS at 09:08

## 2025-01-31 RX ADMIN — SODIUM CHLORIDE: 9 INJECTION, SOLUTION INTRAVENOUS at 08:01

## 2025-01-31 RX ADMIN — Medication 200 MCG: at 11:46

## 2025-01-31 RX ADMIN — ROCURONIUM BROMIDE 20 MG: 10 INJECTION INTRAVENOUS at 10:11

## 2025-01-31 NOTE — Clinical Note
Replaced previous sheath in the right femoral vein. 8fr rfv sheath exchanged for versacross sheath over [place] wire

## 2025-01-31 NOTE — ANESTHESIA PREPROCEDURE EVALUATION
Anesthesia Evaluation     Patient summary reviewed and Nursing notes reviewed   no history of anesthetic complications:   NPO Solid Status: > 8 hours  NPO Liquid Status: > 2 hours           Airway   Mallampati: III  TM distance: >3 FB  Neck ROM: full  Small opening and Possible difficult intubation  Dental - normal exam     Pulmonary - normal exam   (+) a smoker (1984) Former, cigarettes, COPD (occasional albuterol use r/t allergy sx) moderate,sleep apnea on CPAP  (-) shortness of breath, recent URI, no home oxygen    ROS comment: Sats 94--98% RA  Cardiovascular - normal exam  Exercise tolerance: good (4-7 METS)    ECG reviewed  PT is on anticoagulation therapy  Patient on routine beta blocker and Beta blocker given within 24 hours of surgery    (+) hypertension, CAD, dysrhythmias Atrial Fib, hyperlipidemia  (-) past MI, angina, cardiac stents    ROS comment:     ECHO 2024  EF = 44.3% LVDD(grade I) impaired relaxation.  AV  calcification of R coronary cusp(s).Mod AI         CAD  -Left heart catheterization and LAD stent, 40% circumflex artery stenosis, with noted  of small codominant RCA February 2021 [facility]    Paroxysmal a fib   -diagnosed 6/2021, cardioversion in [place] 2022  -PVI with RFA of LA flutter and mitral annulus and anterior LA wall 2/13/23 Siva  -recurrent post PVI a fib 4/6/2023 in [facility] started on sotalol      Neuro/Psych  (+) dizziness/light headedness  (-) seizures, CVA  GI/Hepatic/Renal/Endo    (+) obesity, GERD well controlled  (-) no renal disease (creat normal), diabetes, no thyroid disorder    Musculoskeletal     Abdominal    Substance History - negative use     OB/GYN          Other   arthritis,     ROS/Med Hx Other: hgb 14.9 k 4.7  eliquis  3/22-Aorta measures up to 3.5 cm       Phys Exam Other: McG 4 GIIA V              Anesthesia Plan    ASA 3     general     (May need Hallman  Clearsite HD monitoring EF 43% )  intravenous induction     Anesthetic plan, risks,  benefits, and alternatives have been provided, discussed and informed consent has been obtained with: patient and spouse/significant other.    Use of blood products discussed with patient and spouse/significant other  Consented to blood products.    Plan discussed with CRNA.      CODE STATUS:

## 2025-01-31 NOTE — ANESTHESIA PROCEDURE NOTES
Airway  Urgency: elective    Date/Time: 1/31/2025 8:11 AM  Airway not difficult    General Information and Staff    Patient location during procedure: OR    Indications and Patient Condition  Indications for airway management: airway protection    Preoxygenated: yes  MILS not maintained throughout  Mask difficulty assessment: 1 - vent by mask    Final Airway Details  Final airway type: endotracheal airway      Successful airway: ETT  Cuffed: yes   Successful intubation technique: video laryngoscopy  Facilitating devices/methods: intubating stylet  Endotracheal tube insertion site: oral  Blade: Hallman  Blade size: 4  ETT size (mm): 8.0  Cormack-Lehane Classification: grade IIa - partial view of glottis  Placement verified by: chest auscultation and capnometry   Measured from: lips  ETT/EBT  to lips (cm): 22  Number of attempts at approach: 1  Assessment: lips, teeth, and gum same as pre-op and atraumatic intubation    Additional Comments  Negative epigastric sounds, Breath sound equal bilaterally with symmetric chest rise and fall

## 2025-01-31 NOTE — ANESTHESIA POSTPROCEDURE EVALUATION
Patient: Albino Rivera    Procedure Summary       Date: 01/31/25 Room / Location: [facility] / [facility]    Anesthesia Start: 0752 Anesthesia Stop: 1207    Procedure: Re-do Ablation atrial fibrillation+/- PVC ablation. Hold Sotalol 5 days prior. DNS Eliquis. Diagnosis:       Paroxysmal atrial fibrillation      PVC's (premature ventricular contractions)      (Afib, PVCs)    Providers: Manjeet Paniagua MD Provider: Riky Chinchilla MD    Anesthesia Type: general ASA Status: 3            Anesthesia Type: general    Vitals  No vitals data found for the desired time range.          Post Anesthesia Care and Evaluation    Patient location during evaluation: PACU  Patient participation: waiting for patient participation  Level of consciousness: sleepy but conscious  Pain management: adequate    Airway patency: patent  Anesthetic complications: No anesthetic complications  PONV Status: none  Cardiovascular status: hemodynamically stable and acceptable  Respiratory status: nonlabored ventilation, acceptable and nasal cannula  Hydration status: acceptable    Comments: HR 74  /68  Sats 96  Temp 98     Event Note/ICD interrogation

## 2025-01-31 NOTE — Clinical Note
Hemostasis started on the left femoral vein. Mynx was used in achieving hemostasis. Closure device deployed in the vessel. Hemostasis achieved successfully.

## 2025-01-31 NOTE — Clinical Note
Replaced previous sheath in the right femoral vein. 8fr rfv sheath exchanged for lisa over alba mcnair

## 2025-01-31 NOTE — Clinical Note
Replaced previous sheath in the right femoral vein. Versacross sheath exchanged for vizigo sheath over bayabrahams wire

## 2025-02-02 ENCOUNTER — APPOINTMENT (OUTPATIENT)
Dept: GENERAL RADIOLOGY | Facility: HOSPITAL | Age: 75
End: 2025-02-02
Payer: MEDICARE

## 2025-02-02 ENCOUNTER — HOSPITAL ENCOUNTER (EMERGENCY)
Facility: HOSPITAL | Age: 75
Discharge: HOME | End: 2025-02-02
Attending: EMERGENCY MEDICINE | Admitting: EMERGENCY MEDICINE
Payer: MEDICARE

## 2025-02-02 VITALS
BODY MASS INDEX: 33.64 KG/M2 | DIASTOLIC BLOOD PRESSURE: 64 MMHG | TEMPERATURE: 97.8 F | WEIGHT: 235 LBS | RESPIRATION RATE: 20 BRPM | HEART RATE: 87 BPM | SYSTOLIC BLOOD PRESSURE: 137 MMHG | HEIGHT: 70 IN | OXYGEN SATURATION: 90 %

## 2025-02-02 DIAGNOSIS — I47.10 SUPRAVENTRICULAR TACHYCARDIA: Primary | ICD-10-CM

## 2025-02-02 DIAGNOSIS — Z86.79 STATUS POST ABLATION OF ATRIAL FIBRILLATION: ICD-10-CM

## 2025-02-02 DIAGNOSIS — Z98.890 STATUS POST ABLATION OF ATRIAL FIBRILLATION: ICD-10-CM

## 2025-02-02 LAB
ALBUMIN SERPL-MCNC: 3.8 G/DL (ref 3.5–5.2)
ALBUMIN/GLOB SERPL: 1.4 G/DL
ALP SERPL-CCNC: 67 U/L (ref 39–117)
ALT SERPL W P-5'-P-CCNC: 14 U/L (ref 1–41)
ANION GAP SERPL CALCULATED.3IONS-SCNC: 10 MMOL/L (ref 5–15)
AST SERPL-CCNC: 36 U/L (ref 1–40)
BASOPHILS # BLD AUTO: 0.08 10*3/MM3 (ref 0–0.2)
BASOPHILS NFR BLD AUTO: 0.6 % (ref 0–1.5)
BILIRUB SERPL-MCNC: 1.1 MG/DL (ref 0–1.2)
BUN SERPL-MCNC: 15 MG/DL (ref 8–23)
BUN/CREAT SERPL: 15.6 (ref 7–25)
CALCIUM SPEC-SCNC: 9.4 MG/DL (ref 8.6–10.5)
CHLORIDE SERPL-SCNC: 101 MMOL/L (ref 98–107)
CO2 SERPL-SCNC: 26 MMOL/L (ref 22–29)
CREAT SERPL-MCNC: 0.96 MG/DL (ref 0.76–1.27)
DEPRECATED RDW RBC AUTO: 43.9 FL (ref 37–54)
EGFRCR SERPLBLD CKD-EPI 2021: 82.4 ML/MIN/1.73
EOSINOPHIL # BLD AUTO: 0.36 10*3/MM3 (ref 0–0.4)
EOSINOPHIL NFR BLD AUTO: 2.9 % (ref 0.3–6.2)
ERYTHROCYTE [DISTWIDTH] IN BLOOD BY AUTOMATED COUNT: 12.9 % (ref 12.3–15.4)
GEN 5 1HR TROPONIN T REFLEX: 975 NG/L
GLOBULIN UR ELPH-MCNC: 2.8 GM/DL
GLUCOSE SERPL-MCNC: 134 MG/DL (ref 65–99)
HCT VFR BLD AUTO: 46.3 % (ref 37.5–51)
HGB BLD-MCNC: 15.6 G/DL (ref 13–17.7)
HOLD SPECIMEN: NORMAL
IMM GRANULOCYTES # BLD AUTO: 0.03 10*3/MM3 (ref 0–0.05)
IMM GRANULOCYTES NFR BLD AUTO: 0.2 % (ref 0–0.5)
LYMPHOCYTES # BLD AUTO: 1.57 10*3/MM3 (ref 0.7–3.1)
LYMPHOCYTES NFR BLD AUTO: 12.6 % (ref 19.6–45.3)
MAGNESIUM SERPL-MCNC: 1.7 MG/DL (ref 1.6–2.4)
MCH RBC QN AUTO: 31.3 PG (ref 26.6–33)
MCHC RBC AUTO-ENTMCNC: 33.7 G/DL (ref 31.5–35.7)
MCV RBC AUTO: 93 FL (ref 79–97)
MONOCYTES # BLD AUTO: 0.69 10*3/MM3 (ref 0.1–0.9)
MONOCYTES NFR BLD AUTO: 5.5 % (ref 5–12)
NEUTROPHILS NFR BLD AUTO: 78.2 % (ref 42.7–76)
NEUTROPHILS NFR BLD AUTO: 9.71 10*3/MM3 (ref 1.7–7)
NRBC BLD AUTO-RTO: 0 /100 WBC (ref 0–0.2)
NT-PROBNP SERPL-MCNC: 3576 PG/ML (ref 0–1800)
PLATELET # BLD AUTO: 215 10*3/MM3 (ref 140–450)
PMV BLD AUTO: 9.8 FL (ref 6–12)
POTASSIUM SERPL-SCNC: 4.1 MMOL/L (ref 3.5–5.2)
PROT SERPL-MCNC: 6.6 G/DL (ref 6–8.5)
RBC # BLD AUTO: 4.98 10*6/MM3 (ref 4.14–5.8)
SODIUM SERPL-SCNC: 137 MMOL/L (ref 136–145)
TROPONIN T % DELTA: -11
TROPONIN T NUMERIC DELTA: -124 NG/L
TROPONIN T SERPL HS-MCNC: 1099 NG/L
WBC NRBC COR # BLD AUTO: 12.44 10*3/MM3 (ref 3.4–10.8)
WHOLE BLOOD HOLD COAG: NORMAL
WHOLE BLOOD HOLD SPECIMEN: NORMAL

## 2025-02-02 PROCEDURE — 99285 EMERGENCY DEPT VISIT HI MDM: CPT

## 2025-02-02 PROCEDURE — 25010000002 ADENOSINE PER 6 MG

## 2025-02-02 PROCEDURE — 80053 COMPREHEN METABOLIC PANEL: CPT | Performed by: EMERGENCY MEDICINE

## 2025-02-02 PROCEDURE — 84484 ASSAY OF TROPONIN QUANT: CPT | Performed by: EMERGENCY MEDICINE

## 2025-02-02 PROCEDURE — 83735 ASSAY OF MAGNESIUM: CPT | Performed by: EMERGENCY MEDICINE

## 2025-02-02 PROCEDURE — 25010000002 ADENOSINE PER 6 MG: Performed by: EMERGENCY MEDICINE

## 2025-02-02 PROCEDURE — 93005 ELECTROCARDIOGRAM TRACING: CPT | Performed by: EMERGENCY MEDICINE

## 2025-02-02 PROCEDURE — 96375 TX/PRO/DX INJ NEW DRUG ADDON: CPT

## 2025-02-02 PROCEDURE — 71045 X-RAY EXAM CHEST 1 VIEW: CPT

## 2025-02-02 PROCEDURE — 25810000003 SODIUM CHLORIDE 0.9 % SOLUTION: Performed by: EMERGENCY MEDICINE

## 2025-02-02 PROCEDURE — 83880 ASSAY OF NATRIURETIC PEPTIDE: CPT | Performed by: EMERGENCY MEDICINE

## 2025-02-02 PROCEDURE — 36415 COLL VENOUS BLD VENIPUNCTURE: CPT

## 2025-02-02 PROCEDURE — 96374 THER/PROPH/DIAG INJ IV PUSH: CPT

## 2025-02-02 PROCEDURE — 85025 COMPLETE CBC W/AUTO DIFF WBC: CPT | Performed by: EMERGENCY MEDICINE

## 2025-02-02 RX ORDER — ADENOSINE 3 MG/ML
12 INJECTION, SOLUTION INTRAVENOUS ONCE
Status: COMPLETED | OUTPATIENT
Start: 2025-02-02 | End: 2025-02-02

## 2025-02-02 RX ORDER — DILTIAZEM HYDROCHLORIDE 180 MG/1
180 CAPSULE, COATED, EXTENDED RELEASE ORAL DAILY
Qty: 30 CAPSULE | Refills: 0 | Status: SHIPPED | OUTPATIENT
Start: 2025-02-02

## 2025-02-02 RX ORDER — DILTIAZEM HYDROCHLORIDE 5 MG/ML
10 INJECTION INTRAVENOUS ONCE
Status: COMPLETED | OUTPATIENT
Start: 2025-02-02 | End: 2025-02-02

## 2025-02-02 RX ORDER — SODIUM CHLORIDE 0.9 % (FLUSH) 0.9 %
10 SYRINGE (ML) INJECTION AS NEEDED
Status: DISCONTINUED | OUTPATIENT
Start: 2025-02-02 | End: 2025-02-02 | Stop reason: HOSPADM

## 2025-02-02 RX ORDER — ADENOSINE 3 MG/ML
INJECTION, SOLUTION INTRAVENOUS
Status: COMPLETED
Start: 2025-02-02 | End: 2025-02-02

## 2025-02-02 RX ORDER — DILTIAZEM HYDROCHLORIDE 180 MG/1
180 CAPSULE, COATED, EXTENDED RELEASE ORAL ONCE
Status: COMPLETED | OUTPATIENT
Start: 2025-02-02 | End: 2025-02-02

## 2025-02-02 RX ADMIN — DILTIAZEM HYDROCHLORIDE 180 MG: 180 CAPSULE, EXTENDED RELEASE ORAL at 11:14

## 2025-02-02 RX ADMIN — ADENOSINE 12 MG: 3 INJECTION INTRAVENOUS at 09:09

## 2025-02-02 RX ADMIN — SODIUM CHLORIDE 500 ML: 9 INJECTION, SOLUTION INTRAVENOUS at 09:10

## 2025-02-02 RX ADMIN — SODIUM CHLORIDE 1000 ML: 9 INJECTION, SOLUTION INTRAVENOUS at 09:18

## 2025-02-02 RX ADMIN — ADENOSINE 6 MG: 3 INJECTION INTRAVENOUS at 09:06

## 2025-02-02 RX ADMIN — DILTIAZEM HYDROCHLORIDE 10 MG: 5 INJECTION, SOLUTION INTRAVENOUS at 09:21

## 2025-02-02 NOTE — ED PROVIDER NOTES
Subjective   History of Present Illness  Mr Rivera presents with rapid palpitations and weakness.  He had and ablation 2 days ago.  He tells me since that time he has had intermittent heart rates mildly elevated.  Today has been 180-190 and he feels weak.  He takes metoprolol twice a day.  He has had his dose today.      Review of Systems    Past Medical History:   Diagnosis Date    A-fib     AAA (abdominal aortic aneurysm)     Abnormal echocardiogram     Abnormal heart rhythm     Acid reflux     Aneurysm 2019    Arthritis     BILAT HANDS    Athscl heart disease of native coronary artery w/o ang pctrs     BPH (benign prostatic hyperplasia)     Bradycardia, unspecified     CAD (coronary artery disease)     S/P LAD STENT 2/21 AND  RCA    Cataracts, bilateral     COPD (chronic obstructive pulmonary disease)     Dislocated elbow     AGE 16    Double vision     SYMPTON, EPISODES OF DOUBLE VISION- RARE    Easy bruising     Eczema     NECK AND ELBOWS    GERD (gastroesophageal reflux disease)     Hearing loss     more in right eAR- 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    High cholesterol     History of cardioversion 10/2022    3X    History of stress test     Hypertension     Irregular heart beat     Kidney stones     HAD SUYRGERY -X2    Other emphysema     Other hypersomnia     Other long term (current) drug therapy     Paroxysmal atrial fibrillation     Percutaneous transluminal coronary angioplasty (PTCA) within last 14 to 24 months     Precordial pain     Prostate disorder     PROBLEMS    Pure hypercholesterolemia, unspecified     Sleep apnea     CPAP - COMPLIANT WITH MACHINE    Supraventricular tachycardia     Tinnitus     right ear    UTI (urinary tract infection) 06/2024    Vertigo     Wears glasses     Wears glasses        Allergies   Allergen Reactions    Ace Inhibitors Cough       Past Surgical History:   Procedure Laterality Date    APPENDECTOMY  1959    CARDIAC CATHETERIZATION      CARDIAC ELECTROPHYSIOLOGY PROCEDURE N/A  2023    Procedure: Ablation atrial fibrillation. Hold Eliquis the morning of the procedure.;  Surgeon: Manjeet Paniagua MD;  [place] [facility];  Service: Cardiovascular;  Laterality: N/A;    CATARACT EXTRACTION Bilateral     COLONOSCOPY      CORONARY ANGIOPLASTY WITH STENT PLACEMENT      one stent    KIDNEY STONE SURGERY      -2020   X2    POLYPECTOMY      WITH COLONOSCOPY    UMBILICAL HERNIA REPAIR  2019    MESH IN PLACE PT THINKS    WISDOM TOOTH EXTRACTION         Family History   Problem Relation Age of Onset    Heart failure Father     Heart attack Father     Heart attack Brother     Prostate cancer Brother     Asthma Brother     Melanoma Brother     Atrial fibrillation Brother     Heart attack Brother        Social History     Socioeconomic History    Marital status:     Number of children: 2   Tobacco Use    Smoking status: Former     Current packs/day: 0.00     Average packs/day: 2.0 packs/day for 17.0 years (34.0 ttl pk-yrs)     Types: Cigarettes     Start date: 1968     Quit date: 1984     Years since quittin.1     Passive exposure: Past    Smokeless tobacco: Never   Vaping Use    Vaping status: Never Used   Substance and Sexual Activity    Alcohol use: Not Currently     Alcohol/week: 7.0 standard drinks of alcohol     Types: 7 Glasses of wine per week     Comment: a few drink a week    Drug use: Never    Sexual activity: Not Currently     Partners: Female     Birth control/protection: Condom, Pill           Objective   Physical Exam  Vitals and nursing note reviewed.   Constitutional:       General: He is not in acute distress.     Appearance: Normal appearance.   HENT:      Head: Normocephalic and atraumatic.      Nose: Nose normal. No congestion or rhinorrhea.   Eyes:      General: No scleral icterus.     Conjunctiva/sclera: Conjunctivae normal.   Neck:      Comments: No JVD   Cardiovascular:      Rate and Rhythm: Regular rhythm. Tachycardia present.       Heart sounds: No murmur heard.     No friction rub.   Pulmonary:      Effort: Pulmonary effort is normal.      Breath sounds: Normal breath sounds. No wheezing or rales.   Abdominal:      General: Bowel sounds are normal.      Palpations: Abdomen is soft.      Tenderness: There is no abdominal tenderness. There is no guarding or rebound.   Musculoskeletal:         General: No tenderness.      Cervical back: Normal range of motion and neck supple.      Right lower leg: No edema.      Left lower leg: No edema.   Skin:     General: Skin is warm and dry.      Coloration: Skin is not pale.      Findings: No erythema.   Neurological:      General: No focal deficit present.      Mental Status: He is alert and oriented to person, place, and time.      Motor: No weakness.      Coordination: Coordination normal.   Psychiatric:         Mood and Affect: Mood normal.         Behavior: Behavior normal.         Thought Content: Thought content normal.         Critical Care    Performed by: Federico Sinha MD  Authorized by: Federico Sinha MD    Critical care provider statement:     Critical care time (minutes):  35    Critical care was necessary to treat or prevent imminent or life-threatening deterioration of the following conditions:  Cardiac failure    Critical care was time spent personally by me on the following activities:  Discussions with consultants, evaluation of patient's response to treatment, review of old charts, re-evaluation of patient's condition, pulse oximetry, ordering and review of radiographic studies, ordering and review of laboratory studies and ordering and performing treatments and interventions  Comments:      Saw him on arrival.  Reviewed and interpreted records.  Attempted chemical cardioversion with 2 doses of adenosine.  Perform vagal maneuvers.  Gave IV Cardizem and he converted to normal sinus rhythm.  Had cardiology consultation.             ED Course  ED Course as of 02/02/25 1722   [place] Feb  02, 2025   0910 Perform vagal maneuvers which were ineffective , attempted adenosine 6 and 12.  No significant change in rhythm.Paged Dr Vivar. [DT]   0931 Has now converted to normal sinus rhythm after Cardizem. [DT]   0943 D/w Dr Vivar.  He asked that we start 180 mg Cardizem long-acting. [DT]      ED Course User Index  [DT] Federico Sinha MD                                                       Medical Decision Making  Performed chemical cardioversion x 2 with adenosine.  Gave IV Cardizem.  Consulted cardiology.  Gave IV fluid bolus.  Ordered and interpreted multiple labs.  Reviewed and interpreted recent records.  Converted to normal sinus rhythm with IV Cardizem.    Problems Addressed:  Status post ablation of atrial fibrillation: complicated acute illness or injury  Supraventricular tachycardia: complicated acute illness or injury that poses a threat to life or bodily functions    Amount and/or Complexity of Data Reviewed  Labs: ordered.  Radiology: ordered.  ECG/medicine tests: ordered.    Risk  Prescription drug management.    Critical Care  Total time providing critical care: 35 minutes        Final diagnoses:   Supraventricular tachycardia   Status post ablation of atrial fibrillation       ED Disposition  ED Disposition       ED Disposition   Discharge    Condition   Stable    Comment   --               No follow-up provider specified.       Medication List        New Prescriptions      dilTIAZem  MG 24 hr capsule  Commonly known as: CARDIZEM CD  Take 1 capsule by mouth Daily.               Where to Get Your Medications        These medications were sent to [facility][id number] - [address] AT [facility] - [phone]  - [phone] [address] [address]      Phone: [phone]   dilTIAZem  MG 24 hr capsule            Federico Sinha MD  02/02/25 6910

## 2025-02-02 NOTE — DISCHARGE INSTRUCTIONS
Return if further similar episodes or any other concerns.  Return if you develop fever, shortness of breath, worsening of your cough, or any other concerns.  Begin the medicine I have prescribed tomorrow.

## 2025-02-03 ENCOUNTER — TELEPHONE (OUTPATIENT)
Dept: CARDIOLOGY | Facility: HOSPITAL | Age: 75
End: 2025-02-03
Payer: MEDICARE

## 2025-02-03 ENCOUNTER — CALL CENTER PROGRAMS (OUTPATIENT)
Dept: CALL CENTER | Facility: HOSPITAL | Age: 75
End: 2025-02-03
Payer: MEDICARE

## 2025-02-03 NOTE — TELEPHONE ENCOUNTER
Patient had an ablation on Friday and states that he ended up in the ER last night for intermittent tachycardia between 180-190 bpm. He states that he was instructed to call our office to see if he needed to have a follow-up. He reports that he was prescibed diltiazem in the ED yesterday and that his HR is 82 bpm today with no symptoms. He states that he is leaving for [place] on Friday and if he needs an appointment that it would need to be this week.

## 2025-02-03 NOTE — OUTREACH NOTE
PCI/Device Survey      Flowsheet Row Responses   Facility patient discharged from? [facility]   Procedure date 01/31/25   Procedure (if device, specify in description) Ablation   Performing MD Dr. Manjeet Paniagua   Attempt successful? Yes   Call start time 1233   Call end time 1247   Symptom comments The pt reports that he went to ER over the weekend for HR up to the 180's. Pt had elevated HR while walking, reports that he felt racing of heart but denies dizziness.   Is the patient taking prescribed medications: Apixaban, ASA   Nursing intervention Reminded to continue to take prescribed medications, Nurse provided patient education   Medication comments ER prescribed Diltiazem daily, HR staying in 70's. Pt will  from pharmacy today   Does the patient have any of the following symptoms related to the cath/surgical site? --  [Bilateral groin sites are without issue pt reports.]   Nursing intervention Patient education provided   Does the patient have an appointment scheduled with the cardiologist? Yes   Appointment comments Cardiology appt 2-5-25, pt is planning to travel to [place] on 2-7-25, he reports.   If the patient is a current smoker, are they able to teach back resources for cessation? Not a smoker   Did the patient feel prepared to go home on the same day as the procedure? Yes   Is the patient satisfied with the same day discharge process? Yes   PCI/Device call completed Yes            Samaria ALVAREZ - Registered Nurse

## 2025-02-05 ENCOUNTER — OFFICE VISIT (OUTPATIENT)
Dept: CARDIOLOGY | Facility: HOSPITAL | Age: 75
End: 2025-02-05
Payer: MEDICARE

## 2025-02-05 VITALS
HEART RATE: 73 BPM | WEIGHT: 212.8 LBS | RESPIRATION RATE: 20 BRPM | HEIGHT: 70 IN | BODY MASS INDEX: 30.46 KG/M2 | OXYGEN SATURATION: 95 % | DIASTOLIC BLOOD PRESSURE: 65 MMHG | SYSTOLIC BLOOD PRESSURE: 137 MMHG

## 2025-02-05 DIAGNOSIS — I10 HYPERTENSION, ESSENTIAL: ICD-10-CM

## 2025-02-05 DIAGNOSIS — I48.0 PAROXYSMAL ATRIAL FIBRILLATION: Primary | ICD-10-CM

## 2025-02-05 LAB
QT INTERVAL: 262 MS
QT INTERVAL: 384 MS
QTC INTERVAL: 453 MS
QTC INTERVAL: 467 MS

## 2025-02-05 NOTE — PROGRESS NOTES
Chief Complaint  Atrial Fibrillation    Subjective      History of Present Illness {CC  Problem List  Visit  Diagnosis   Encounters  Notes  Medications  Labs  Result Review Imaging  Media :23}     Albino Rivera, 74 y.o. male presents to [facility] for Atrial Fibrillation.    Cardiac PMH: (Old records have been reviewed and summarized below)  Problem list:   PAF  Atrial fibrillation/flutter diagnosed on Holter monitor for 6 days revealing episodes of atrial tachycardia/flutter January 2021  Atrial fibrillation with RVR requiring cardioversion procedure June 2022 outside hospital in [place].  Symptomatic with palpitations  Chads Vascor equal to 3 chronic Eliquis therapy  Sotalol therapy ordered per local cardiologist November 2022   S/p pulmonary vein ablation procedure and successful RFA of left-sided atrial flutter around the mitral valve annulus.  Additional ablation for triggers of atrial for been the anterior wall of the left atrium by Dr. Paniagua on February 13, 2023.  Recurrence 2024 with subsequent initiation of sotalol  14d monitor 11/2024: 44/58/138 bpm, 52.2% bradycardia.  7 episodes of atrial tachycardia 2.6% PVCs  Successful redo PVI/posterior wall isolation/atrial flutter CTI flutter by Dr. Paniagua on 1/31/2025.   Coronary disease  Angina pectoris abnormal stress echo small codominant ardiogram with echo revealing apical septal hypokinesis 2021  Left heart catheterization and LAD stent, 40% circumflex artery stenosis, with noted  of small codominant RCA February 2021 [facility]  Echocardiogram December 30, 2021 EF 45 to 50% mild LVH, left atrium is mildly dilated moderate AI mild MR  TTE 12/2024: LVEF 44%, grade 1 diastolic dysfunction, moderate AI  COPD  Hyperlipidemia  Abdominal aortic aneurysm: Followed by Dr. Prince  WILSON-CPAP     HPI:   Patient recently underwent successful redo PVI/posterior wall isolation/atrial flutter CTI flutter by   "Siva on 1/31/2025. Patient was instructed to continue uninterrupted anticoagulation. Patient subsequently evaluated [facility] emergency department and found to have SVT with heart rate 190.  Patient converted to normal sinus rhythm with IV diltiazem.  Dr. Vivar was contacted and initiated on diltiazem  Mg daily.    Patient presents today doing well since recent ED evaluation.  No recurrent palpitation symptoms after initiation of diltiazem CD.. Reports he was having bowel movement during recent onset of tachypalpitation episodes. Denies access site complications, or signs/symptoms of bleeding.  Denies chest pain, dyspnea, near syncope/syncope.  They have continued uninterrupted anticoagulation as instructed. SBP generally less than 140, heart rate in 70s.     Risk factor screening:  Thyroid disorder: None  Sleep apnea: Compliant with CPAP  HTN: SBP generally controlled  Weight: BMI 30.5  Alcohol: None recently      Objective     Vital Signs:   Vitals:    02/05/25 1408 02/05/25 1409   BP: 162/68 137/65   BP Location: Left arm Left arm   Patient Position: Sitting Standing   Cuff Size: Adult Adult   Pulse: 72 73   Resp: 20    SpO2: 96% 95%   Weight: 96.5 kg (212 lb 12.8 oz)    Height: 177.8 cm (70\")      Body mass index is 30.53 kg/m².  Physical Exam  Vitals and nursing note reviewed.   Constitutional:       Appearance: Normal appearance.   HENT:      Head: Normocephalic.   Eyes:      Extraocular Movements: Extraocular movements intact.   Neck:      Vascular: No carotid bruit.   Cardiovascular:      Rate and Rhythm: Normal rate and regular rhythm.      Pulses: Normal pulses.      Heart sounds: Normal heart sounds, S1 normal and S2 normal. No murmur heard.  Pulmonary:      Effort: Pulmonary effort is normal. No respiratory distress.      Breath sounds: Normal breath sounds.   Musculoskeletal:      Cervical back: Neck supple.      Right lower leg: No edema.      Left lower leg: No edema.   Skin:     General: " Skin is warm and dry.   Neurological:      General: No focal deficit present.      Mental Status: He is alert.   Psychiatric:         Mood and Affect: Mood normal.         Behavior: Behavior normal.         Thought Content: Thought content normal.        Data Reviewed:{ Labs  Result Review  Imaging  Med Tab  Media :23}     ECG 12 Lead ED Triage Standing Order; SOA (02/02/2025 08:55)  EP/CRM Study (01/31/2025 12:02)  Adult Transthoracic Echo Complete W/ Cont if Necessary Per Protocol (12/05/2024 10:14)     Magnesium (02/02/2025 09:02)  Comprehensive Metabolic Panel (02/02/2025 09:02)  CBC & Differential (02/02/2025 09:02)  TSH (01/21/2025 10:26)       Assessment & Plan   Assessment and Plan {CC Problem List  Visit Diagnosis  ROS  Review (Popup)  Health Maintenance  Quality  BestPractice  Medications  SmartSets  SnapShot Encounters  Media :23}     1. Paroxysmal atrial fibrillation  -s/p successful redo PVI/posterior wall isolation/atrial flutter CTI flutter by Dr. Paniagua on 1/31/2025.   -Recent ED evaluation with SVT.  No recurrence since initiation of diltiazem during ED evaluation.  -Regular rate/rhythm at time of exam  -[id number]: 4  -Continue anticoagulation with apixaban 5 Mg every 12 hours as prescribed  -Continue metoprolol tartrate 25 Mg twice daily, diltiazem 180 Mg daily as prescribed for now given his recent ED evaluation for SVT.  -Discussed gradual goal of weight reduction  -Continue EP follow-up as scheduled    2. Hypertension, essential  -Reports generally controlled on home monitoring  -Continue diltiazem, metoprolol to tartrate as above.  Continue losartan 50 Mg daily as prescribed      Follow Up {Instructions Charge Capture  Follow-up Communications :23}     Return if symptoms worsen or fail to improve.      Patient was given instructions and counseling regarding his condition or for health maintenance advice. Please see specific information pulled into the AVS if appropriate.  Patient was instructed to call the [facility] with any questions, concerns, or worsening symptoms.    Dictated Utilizing Dragon Dictation   Please note that portions of this note were completed with a voice recognition program. Part of this note may be an electronic transcription/translation of spoken language to printed text using the Dragon Dictation System.

## 2025-02-25 ENCOUNTER — TELEPHONE (OUTPATIENT)
Dept: CARDIOLOGY | Facility: CLINIC | Age: 75
End: 2025-02-25

## 2025-02-25 RX ORDER — DILTIAZEM HYDROCHLORIDE 180 MG/1
180 CAPSULE, COATED, EXTENDED RELEASE ORAL DAILY
Qty: 30 CAPSULE | Refills: 0 | Status: SHIPPED | OUTPATIENT
Start: 2025-02-25

## 2025-02-25 NOTE — TELEPHONE ENCOUNTER
Caller: Albino Rivera    Relationship to patient: Self    Best call back number: [phone]       Type of visit: F/U APPT    Requested date: ANYTIME IN MARCH       Additional notes:PLEASE CONTACT PATIENT WITH A SUITABLE DATE.

## 2025-02-25 NOTE — TELEPHONE ENCOUNTER
Caller: Albino Rivera    Relationship: Self    Best call back number: [phone]     Requested Prescriptions:   Requested Prescriptions     Pending Prescriptions Disp Refills    dilTIAZem CD (CARDIZEM CD) 180 MG 24 hr capsule 30 capsule 0     Sig: Take 1 capsule by mouth Daily.        Pharmacy where request should be sent: [facility] #[id number] - [address] [phone] [facility] [phone] FX     Last office visit with prescribing clinician: Visit date not found   Last telemedicine visit with prescribing clinician: Visit date not found   Next office visit with prescribing clinician: 4/29/2025     Additional details provided by patient: ORIGINAL PRESCRIBER IS EWILLIE. DOCTOR. PATIENT IS REQUESTING A 90 DAY SUPPLY    Does the patient have less than a 3 day supply:  [] Yes  [x] No    Would you like a call back once the refill request has been completed: [] Yes [x] No    If the office needs to give you a call back, can they leave a voicemail: [] Yes [x] No    Madelyn Bull Rep   02/25/25 11:30 EST

## 2025-02-25 NOTE — TELEPHONE ENCOUNTER
VANDANA SNOW for PT.  Scheduled PT for 3/24/25 with Dr Prince - per PT request.   OK for [facility] to reschedule PT to any location that meets his needs on different day or time.

## 2025-02-25 NOTE — TELEPHONE ENCOUNTER
Name: Albino Rivera      Relationship: Self      Best Callback Number: [phone]      [facility] PROVIDED THE RELAY MESSAGE FROM THE OFFICE      PATIENT: VOICED UNDERSTANDING AND HAS NO FURTHER QUESTIONS AT THIS TIME    ADDITIONAL INFORMATION:

## 2025-03-13 ENCOUNTER — OFFICE VISIT (OUTPATIENT)
Age: 75
End: 2025-03-13
Payer: MEDICARE

## 2025-03-13 VITALS
DIASTOLIC BLOOD PRESSURE: 62 MMHG | BODY MASS INDEX: 30.49 KG/M2 | HEART RATE: 92 BPM | HEIGHT: 70 IN | WEIGHT: 213 LBS | SYSTOLIC BLOOD PRESSURE: 128 MMHG | OXYGEN SATURATION: 96 %

## 2025-03-13 DIAGNOSIS — I25.10 CORONARY ARTERY DISEASE INVOLVING NATIVE CORONARY ARTERY OF NATIVE HEART, UNSPECIFIED WHETHER ANGINA PRESENT: ICD-10-CM

## 2025-03-13 DIAGNOSIS — I10 HYPERTENSION, ESSENTIAL: ICD-10-CM

## 2025-03-13 DIAGNOSIS — I71.40 ABDOMINAL AORTIC ANEURYSM (AAA) WITHOUT RUPTURE, UNSPECIFIED PART: ICD-10-CM

## 2025-03-13 DIAGNOSIS — I48.0 PAROXYSMAL ATRIAL FIBRILLATION: Primary | ICD-10-CM

## 2025-03-13 PROCEDURE — 3074F SYST BP LT 130 MM HG: CPT | Performed by: INTERNAL MEDICINE

## 2025-03-13 PROCEDURE — 3078F DIAST BP <80 MM HG: CPT | Performed by: INTERNAL MEDICINE

## 2025-03-13 PROCEDURE — 99214 OFFICE O/P EST MOD 30 MIN: CPT | Performed by: INTERNAL MEDICINE

## 2025-03-13 PROCEDURE — 93000 ELECTROCARDIOGRAM COMPLETE: CPT | Performed by: INTERNAL MEDICINE

## 2025-03-13 RX ORDER — DILTIAZEM HYDROCHLORIDE 240 MG/1
240 CAPSULE, COATED, EXTENDED RELEASE ORAL DAILY
Qty: 30 CAPSULE | Refills: 11 | Status: SHIPPED | OUTPATIENT
Start: 2025-03-13

## 2025-03-13 RX ORDER — LOSARTAN POTASSIUM 25 MG/1
25 TABLET ORAL DAILY
Qty: 30 TABLET | Refills: 11 | Status: SHIPPED | OUTPATIENT
Start: 2025-03-13

## 2025-03-13 NOTE — ASSESSMENT & PLAN NOTE
Well-controlled but will decrease losartan dose while going up on diltiazem.    Orders:    losartan (COZAAR) 25 MG tablet; Take 1 tablet by mouth Daily.

## 2025-03-13 NOTE — PROGRESS NOTES
[facility] Provider Note     Date:   2025   Name: Albino Rivera  :   1950  PCP: Linsey Jung MD    Chief Complaint   Patient presents with    Atrial Fibrillation     Pt states he is here today for follow up after having an ablation. He states he has been in and out of atrial fib per [employer]. No chest pain. Sometimes has SOA but does also have COPD.       Subjective     History of Present Illness  Albino Rivera is a 74 y.o. male who presents today for follow up with atrial fib and ablation.  HR has been steady 90's. 150's with A-fib. Has [employer] and has his reports with him.  As long as HR is normal feels okay.  Has been going for walks and HR about 107 after that,  Reports normal exertional shortness of breath.  Positive for palpitations and will have some lightheadedness when HR really high. Has had 7 recorded a-fib since .   Reports no swelling  No falls.   No real dizziness but vertigo is better. No syncope.  Wondering about changing or adjusting medication. They had gave him diltiazem and B/P went lower.    Cardiology/Sleep History  1. CAD  -Left heart catheterization and LAD stent, 40% circumflex artery stenosis, with noted  of small codominant RCA 2021 [facility]  2. Paroxysmal a fib   -diagnosed 2021, cardioversion in [place]   -PVI with RFA of LA flutter and mitral annulus and anterior LA wall 23 Siva  -recurrent post PVI a fib 2023 in [facility] started on sotalol  -re-do PVI with Dr Paniagua 2025  -ER visit  for a fib/SVT converted with diltiazem  3. WILSON  -AHI 16 on HST 2022  -on CPAP  4. AAA 3.5cm last eval 3/4/22  5. Mitral and aortic valve insufficiency - moderate     Allergies   Allergen Reactions    Ace Inhibitors Cough       Current Outpatient Medications:     acetaminophen (TYLENOL) 500 MG tablet, Take 650 mg by mouth As Needed for Mild Pain., Disp: , Rfl:     albuterol sulfate HFA  108 (90 Base) MCG/ACT inhaler, Inhale 1 puff Every 4 (Four) Hours As Needed., Disp: , Rfl:     aspirin 81 MG chewable tablet, Chew 1 tablet Daily., Disp: , Rfl:     Cetirizine HCl (ZyrTEC Allergy) 10 MG capsule, Take 10 mg by mouth As Needed., Disp: , Rfl:     dilTIAZem CD (CARDIZEM CD) 240 MG 24 hr capsule, Take 1 capsule by mouth Daily., Disp: 30 capsule, Rfl: 11    Eliquis 5 MG tablet tablet, TAKE 1 TABLET BY MOUTH TWICE DAILY, Disp: 180 tablet, Rfl: 3    Fluticasone-Umeclidin-Vilant (Trelegy Ellipta) 100-62.5-25 MCG/ACT inhaler, 1 puff Daily., Disp: , Rfl:     loratadine (Claritin) 10 MG tablet, Take 1 tablet by mouth Every Night., Disp: , Rfl:     losartan (COZAAR) 25 MG tablet, Take 1 tablet by mouth Daily., Disp: 30 tablet, Rfl: 11    MAGnesium-Oxide 400 (240 Mg) MG tablet, Take 1 tablet by mouth Daily., Disp: , Rfl:     metoprolol tartrate (LOPRESSOR) 25 MG tablet, Take 1 tablet by mouth 2 (Two) Times a Day., Disp: 180 tablet, Rfl: 3    montelukast (SINGULAIR) 10 MG tablet, Take 1 tablet by mouth Daily., Disp: , Rfl:     Omeprazole Magnesium (PRILOSEC PO), Take 20 mg by mouth. 3 times a week, Disp: , Rfl:     ondansetron (ZOFRAN) 4 MG tablet, , Disp: , Rfl:     rosuvastatin (CRESTOR) 40 MG tablet, Take 1 tablet by mouth Daily., Disp: 90 tablet, Rfl: 3    tamsulosin (FLOMAX) 0.4 MG capsule 24 hr capsule, Take 1 capsule by mouth every night at bedtime., Disp: , Rfl:     vitamin B-12 (CYANOCOBALAMIN) 1000 MCG tablet, Take 1 tablet by mouth Daily., Disp: , Rfl:     Past Medical History:   Diagnosis Date    A-fib     AAA (abdominal aortic aneurysm)     Abnormal echocardiogram     Abnormal heart rhythm     Acid reflux     Aneurysm 2019    Arthritis     BILAT HANDS    Athscl heart disease of native coronary artery w/o ang pctrs     BPH (benign prostatic hyperplasia)     Bradycardia, unspecified     CAD (coronary artery disease)     S/P LAD STENT 2/21 AND  RCA    Cataracts, bilateral     COPD (chronic obstructive  pulmonary disease)     Dislocated elbow     AGE 16    Double vision     SYMPTON, EPISODES OF DOUBLE VISION- RARE    Easy bruising     Eczema     NECK AND ELBOWS    GERD (gastroesophageal reflux disease)     Hearing loss     more in right eAR- 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    High cholesterol     History of cardioversion 10/2022    3X    History of stress test     Hypertension     Irregular heart beat     Kidney stones     HAD SUYRGERY -X2    Other emphysema     Other hypersomnia     Other long term (current) drug therapy     Paroxysmal atrial fibrillation     Percutaneous transluminal coronary angioplasty (PTCA) within last 14 to 24 months     Precordial pain     Prostate disorder     PROBLEMS    Pure hypercholesterolemia, unspecified     Sleep apnea     CPAP - COMPLIANT WITH MACHINE    Supraventricular tachycardia     Tinnitus     right ear    UTI (urinary tract infection) 06/2024    Vertigo     Wears glasses     Wears glasses       Past Surgical History:   Procedure Laterality Date    APPENDECTOMY  1959    CARDIAC CATHETERIZATION      CARDIAC ELECTROPHYSIOLOGY PROCEDURE N/A 02/13/2023    Procedure: Ablation atrial fibrillation. Hold Eliquis the morning of the procedure.;  Surgeon: Manjeet Paniagua MD;  Location:  [facility];  Service: Cardiovascular;  Laterality: N/A;    CARDIAC ELECTROPHYSIOLOGY PROCEDURE  1/31/2025    Procedure: Re-do Ablation atrial fibrillation+/- PVC ablation. Hold Sotalol 5 days prior. DNS Eliquis.;  Surgeon: Manjeet Paniagua MD;  Location:  [facility];  Service: Cardiovascular;;    CATARACT EXTRACTION Bilateral     COLONOSCOPY      CORONARY ANGIOPLASTY WITH STENT PLACEMENT      one stent    KIDNEY STONE SURGERY      2019-2020   X2    POLYPECTOMY      WITH COLONOSCOPY    UMBILICAL HERNIA REPAIR  2019    MESH IN PLACE PT THINKS    WISDOM TOOTH EXTRACTION       Family History   Problem Relation Age of Onset    Heart failure Father     Heart attack Father     Heart attack Brother   "   Prostate cancer Brother     Asthma Brother     Melanoma Brother     Atrial fibrillation Brother     Heart attack Brother      Social History     Socioeconomic History    Marital status:     Number of children: 2   Tobacco Use    Smoking status: Former     Current packs/day: 0.00     Average packs/day: 2.0 packs/day for 17.0 years (34.0 ttl pk-yrs)     Types: Cigarettes     Start date: 1968     Quit date: 1984     Years since quittin.2     Passive exposure: Past    Smokeless tobacco: Never   Vaping Use    Vaping status: Never Used   Substance and Sexual Activity    Alcohol use: Not Currently     Alcohol/week: 4.0 standard drinks of alcohol     Types: 4 Glasses of wine per week     Comment: a few drink a week    Drug use: Never    Sexual activity: Not Currently     Partners: Female     Birth control/protection: Condom, Pill       Objective     Vital Signs:  /62 (BP Location: Right arm, Patient Position: Sitting, Cuff Size: Adult)   Pulse 92   Ht 177.8 cm (70\")   Wt 96.6 kg (213 lb)   SpO2 96%   BMI 30.56 kg/m²   Estimated body mass index is 30.56 kg/m² as calculated from the following:    Height as of this encounter: 177.8 cm (70\").    Weight as of this encounter: 96.6 kg (213 lb).         Physical Exam  Constitutional:       Appearance: Normal appearance. He is well-developed.   HENT:      Head: Normocephalic and atraumatic.   Eyes:      General: No scleral icterus.     Pupils: Pupils are equal, round, and reactive to light.   Cardiovascular:      Rate and Rhythm: Normal rate and regular rhythm.      Heart sounds: Normal heart sounds. No murmur heard.  Pulmonary:      Breath sounds: Normal breath sounds. No wheezing or rhonchi.   Musculoskeletal:      Right lower leg: No edema.      Left lower leg: No edema.   Skin:     Capillary Refill: Capillary refill takes less than 2 seconds.      Coloration: Skin is not cyanotic.      Nails: There is no clubbing.   Neurological:      Mental " Status: He is alert and oriented to person, place, and time.      Motor: No weakness.      Gait: Gait normal.   Psychiatric:         Mood and Affect: Mood normal.         Behavior: Behavior is cooperative.         Thought Content: Thought content normal.         Cognition and Memory: Memory normal.           EKG      ECG 12 Lead    Date/Time: 3/13/2025 12:36 PM  Performed by: Aracelis Prince MD    Authorized by: Aracelis Prince MD  Comparison: compared with previous ECG from 2/25/2025  Similar to previous ECG  Rhythm: sinus rhythm  Rate: normal  Conduction: non-specific intraventricular conduction delay  ST Segments: ST segments normal  QRS axis: left  Other findings: T wave abnormality and left ventricular hypertrophy    Clinical impression: abnormal EKG           Assessment and Plan     Assessment & Plan  Paroxysmal atrial fibrillation  Increase diltiazem to 240 mg.  Decrease losartan while increasing diltiazem.  If this does not work plan to transition him to sotalol.  He is going to message us by [employer] in a week or so.  Orders:    dilTIAZem CD (CARDIZEM CD) 240 MG 24 hr capsule; Take 1 capsule by mouth Daily.    ECG 12 Lead    Coronary artery disease involving native coronary artery of native heart, unspecified whether angina present  On medical therapy.  Limits his antiarrhythmic therapy.  No new EKG changes.    Orders:    ECG 12 Lead    Hypertension, essential  Well-controlled but will decrease losartan dose while going up on diltiazem.    Orders:    losartan (COZAAR) 25 MG tablet; Take 1 tablet by mouth Daily.    Abdominal aortic aneurysm (AAA) without rupture, unspecified part  Due for vascular ultrasound.                    Follow Up  Return in about 3 weeks (around 4/3/2025) for Next scheduled follow up.    AURY Prince MD  Cardiology and Sleep [facility]  03/13/2025     Please note that this explicitly excludes time spent on other separate billable services such as performing procedures or  test interpretation, when applicable.    This note was created using dictation software which occasionally transcribes nonsensical phrases. Please contact the provider if any clarification is needed.

## 2025-03-13 NOTE — ASSESSMENT & PLAN NOTE
On medical therapy.  Limits his antiarrhythmic therapy.  No new EKG changes.    Orders:    ECG 12 Lead

## 2025-03-13 NOTE — ASSESSMENT & PLAN NOTE
Increase diltiazem to 240 mg.  Decrease losartan while increasing diltiazem.  If this does not work plan to transition him to sotalol.  He is going to message us by [employer] in a week or so.  Orders:    dilTIAZem CD (CARDIZEM CD) 240 MG 24 hr capsule; Take 1 capsule by mouth Daily.    ECG 12 Lead

## 2025-03-20 ENCOUNTER — PATIENT MESSAGE (OUTPATIENT)
Age: 75
End: 2025-03-20
Payer: MEDICARE

## 2025-04-03 ENCOUNTER — HOSPITAL ENCOUNTER (OUTPATIENT)
Facility: HOSPITAL | Age: 75
Discharge: HOME | End: 2025-04-03
Admitting: INTERNAL MEDICINE
Payer: MEDICARE

## 2025-04-03 ENCOUNTER — OFFICE VISIT (OUTPATIENT)
Age: 75
End: 2025-04-03
Payer: MEDICARE

## 2025-04-03 VITALS — HEIGHT: 70 IN | BODY MASS INDEX: 30.49 KG/M2 | WEIGHT: 212.96 LBS

## 2025-04-03 VITALS
HEIGHT: 70 IN | OXYGEN SATURATION: 96 % | BODY MASS INDEX: 30.49 KG/M2 | SYSTOLIC BLOOD PRESSURE: 118 MMHG | HEART RATE: 88 BPM | WEIGHT: 213 LBS | DIASTOLIC BLOOD PRESSURE: 78 MMHG

## 2025-04-03 DIAGNOSIS — I71.40 ABDOMINAL AORTIC ANEURYSM (AAA) WITHOUT RUPTURE, UNSPECIFIED PART: ICD-10-CM

## 2025-04-03 DIAGNOSIS — I25.10 CORONARY ARTERY DISEASE INVOLVING NATIVE CORONARY ARTERY OF NATIVE HEART, UNSPECIFIED WHETHER ANGINA PRESENT: ICD-10-CM

## 2025-04-03 DIAGNOSIS — I48.0 PAROXYSMAL ATRIAL FIBRILLATION: Primary | ICD-10-CM

## 2025-04-03 DIAGNOSIS — I10 HYPERTENSION, ESSENTIAL: ICD-10-CM

## 2025-04-03 LAB
ABDOMINAL DIST AORTA AP: 3.2 CM
ABDOMINAL DIST AORTA TRANS: 4.8 CM
ABDOMINAL DIST AORTA VEL: 71.4 CM/S
ABDOMINAL MID AORTA AP: 2 CM
ABDOMINAL MID AORTA TRANS: 2.5 CM
ABDOMINAL MID AORTA VEL: 64.7 CM/S
ABDOMINAL PROX AORTA AP: 2.2 CM
ABDOMINAL PROX AORTA TRANS: 2.8 CM
ABDOMINAL PROX AORTA VEL: 93.6 CM/S
BH CV VAS ABD AO IVC SPONTANEOUS SCRIPTING: NORMAL

## 2025-04-03 PROCEDURE — 93978 VASCULAR STUDY: CPT | Performed by: INTERNAL MEDICINE

## 2025-04-03 PROCEDURE — 93978 VASCULAR STUDY: CPT

## 2025-04-03 RX ORDER — INHALER, ASSIST DEVICES
SPACER (EA) MISCELLANEOUS
COMMUNITY
Start: 2025-04-02

## 2025-04-03 RX ORDER — BUDESONIDE, GLYCOPYRROLATE, AND FORMOTEROL FUMARATE 160; 9; 4.8 UG/1; UG/1; UG/1
2 AEROSOL, METERED RESPIRATORY (INHALATION)
COMMUNITY
Start: 2025-04-02 | End: 2025-07-01

## 2025-04-03 NOTE — PROGRESS NOTES
Cardiovascular and Sleep [facility]ing Provider Note     Date:   2025   Name: Albino Rivera  :   1950  PCP: Linsey Jung MD    Chief Complaint   Patient presents with    Atrial Fibrillation     Pt states he is here today for follow up atrial fib. He did have an echo done here at [facility] this morning. No chest pain or SOA.        Subjective     History of Present Illness  Albino Rivera is a 74 y.o. male with PAF, CAD, HTN, AAA who presents today for follow up.   Had ECHO this morning. Would like results from that.  States that has had a little a-fib but not as bad as before. Thinks that feels as though the medication is working. Can put events to the episodes of A-fib.  No other problems.  Can sometimes feel palpitations. Reports that HR has to be pretty high.   No dizziness. No syncope.  Has reported no unexplained shortness of breath.  No swelling.  Has concerns over going in and out of A-fib .States that it is not debilitating but does have to stop and rest. States that it last about a half hour at a time.  HR is staying around the 90 area after ablation and reports that he was used to it being in the 60 area.The only time he notices this is when he is laying down and can hear the palpating.  Reports have been using cardio mobile at least twice a day and reported that after choir every Wednesday night will go into  A-Fib.    Cardiology/Sleep History  1. CAD  -Left heart catheterization and LAD stent, 40% circumflex artery stenosis, with noted  of small codominant RCA 2021 [facility]  2. Paroxysmal a fib   -diagnosed 2021, cardioversion in [place]   -PVI with RFA of LA flutter and mitral annulus and anterior LA wall 23 Siva  -recurrent post PVI a fib 2023 in [facility] started on sotalol  -re-do PVI with Dr Paniagua 2025  -ER visit  for a fib/SVT converted with diltiazem  3. WILSON  -AHI 16 on HST 2022  -on CPAP  4. AAA 3.5cm  last eval 3/4/22  5. Mitral and aortic valve insufficiency - moderate 2024    Allergies   Allergen Reactions    Ace Inhibitors Cough       Current Outpatient Medications:     acetaminophen (TYLENOL) 500 MG tablet, Take 650 mg by mouth As Needed for Mild Pain., Disp: , Rfl:     albuterol sulfate  (90 Base) MCG/ACT inhaler, Inhale 1 puff Every 4 (Four) Hours As Needed., Disp: , Rfl:     aspirin 81 MG chewable tablet, Chew 1 tablet Daily., Disp: , Rfl:     Breztri Aerosphere 160-9-4.8 MCG/ACT aerosol inhaler, Inhale 2 puffs., Disp: , Rfl:     Cetirizine HCl (ZyrTEC Allergy) 10 MG capsule, Take 10 mg by mouth As Needed., Disp: , Rfl:     dilTIAZem CD (CARDIZEM CD) 240 MG 24 hr capsule, Take 1 capsule by mouth Daily., Disp: 30 capsule, Rfl: 11    Eliquis 5 MG tablet tablet, TAKE 1 TABLET BY MOUTH TWICE DAILY, Disp: 180 tablet, Rfl: 3    loratadine (Claritin) 10 MG tablet, Take 1 tablet by mouth Every Night., Disp: , Rfl:     losartan (COZAAR) 25 MG tablet, Take 1 tablet by mouth Daily., Disp: 30 tablet, Rfl: 11    MAGnesium-Oxide 400 (240 Mg) MG tablet, Take 1 tablet by mouth Daily., Disp: , Rfl:     metoprolol succinate XL (TOPROL-XL) 50 MG 24 hr tablet, Take 1 tablet by mouth Daily., Disp: 30 tablet, Rfl: 11    montelukast (SINGULAIR) 10 MG tablet, Take 1 tablet by mouth Daily., Disp: , Rfl:     Omeprazole Magnesium (PRILOSEC PO), Take 20 mg by mouth. 3 times a week, Disp: , Rfl:     ondansetron (ZOFRAN) 4 MG tablet, , Disp: , Rfl:     rosuvastatin (CRESTOR) 40 MG tablet, Take 1 tablet by mouth Daily., Disp: 90 tablet, Rfl: 3    Spacer/Aero-Holding Chambers (OptiChamber Nolvia) misc, , Disp: , Rfl:     tamsulosin (FLOMAX) 0.4 MG capsule 24 hr capsule, Take 1 capsule by mouth every night at bedtime., Disp: , Rfl:     vitamin B-12 (CYANOCOBALAMIN) 1000 MCG tablet, Take 1 tablet by mouth Daily., Disp: , Rfl:     Past Medical History:   Diagnosis Date    A-fib     AAA (abdominal aortic aneurysm)     Abnormal  echocardiogram     Abnormal heart rhythm     Acid reflux     Aneurysm 2019    Arthritis     BILAT HANDS    Athscl heart disease of native coronary artery w/o ang pctrs     BPH (benign prostatic hyperplasia)     Bradycardia, unspecified     CAD (coronary artery disease)     S/P LAD STENT 2/21 AND  RCA    Cataracts, bilateral     COPD (chronic obstructive pulmonary disease)     Dislocated elbow     AGE 16    Double vision     SYMPTON, EPISODES OF DOUBLE VISION- RARE    Easy bruising     Eczema     NECK AND ELBOWS    GERD (gastroesophageal reflux disease)     Hearing loss     more in right eAR- 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    High cholesterol     History of cardioversion 10/2022    3X    History of stress test     Hypertension     Irregular heart beat     Kidney stones     HAD SUYRGERY -X2    Other emphysema     Other hypersomnia     Other long term (current) drug therapy     Paroxysmal atrial fibrillation     Percutaneous transluminal coronary angioplasty (PTCA) within last 14 to 24 months     Precordial pain     Prostate disorder     PROBLEMS    Pure hypercholesterolemia, unspecified     Sleep apnea     CPAP - COMPLIANT WITH MACHINE    Supraventricular tachycardia     Tinnitus     right ear    UTI (urinary tract infection) 06/2024    Vertigo     Wears glasses     Wears glasses       Past Surgical History:   Procedure Laterality Date    APPENDECTOMY  1959    CARDIAC CATHETERIZATION      CARDIAC ELECTROPHYSIOLOGY PROCEDURE N/A 02/13/2023    Procedure: Ablation atrial fibrillation. Hold Eliquis the morning of the procedure.;  Surgeon: Manjeet Paniagua MD;  Location:  [facility];  Service: Cardiovascular;  Laterality: N/A;    CARDIAC ELECTROPHYSIOLOGY PROCEDURE  1/31/2025    Procedure: Re-do Ablation atrial fibrillation+/- PVC ablation. Hold Sotalol 5 days prior. DNS Eliquis.;  Surgeon: Manjeet Paniagua MD;  Location:  [facility];  Service: Cardiovascular;;    CATARACT EXTRACTION Bilateral      "COLONOSCOPY      CORONARY ANGIOPLASTY WITH STENT PLACEMENT      one stent    KIDNEY STONE SURGERY      -2020   X2    POLYPECTOMY      WITH COLONOSCOPY    UMBILICAL HERNIA REPAIR  2019    MESH IN PLACE PT THINKS    WISDOM TOOTH EXTRACTION       Family History   Problem Relation Age of Onset    Heart failure Father     Heart attack Father     Heart attack Brother     Prostate cancer Brother     Asthma Brother     Melanoma Brother     Atrial fibrillation Brother     Heart attack Brother      Social History     Socioeconomic History    Marital status:     Number of children: 2   Tobacco Use    Smoking status: Former     Current packs/day: 0.00     Average packs/day: 2.0 packs/day for 17.0 years (34.0 ttl pk-yrs)     Types: Cigarettes     Start date: 1968     Quit date: 1984     Years since quittin.2     Passive exposure: Past    Smokeless tobacco: Never   Vaping Use    Vaping status: Never Used   Substance and Sexual Activity    Alcohol use: Not Currently     Alcohol/week: 4.0 standard drinks of alcohol     Types: 4 Glasses of wine per week     Comment: a few drink a week    Drug use: Never    Sexual activity: Not Currently     Partners: Female     Birth control/protection: Condom, Pill       Objective     Vital Signs:  /78 (BP Location: Right arm, Patient Position: Sitting, Cuff Size: Adult)   Pulse 88   Ht 177.8 cm (70\")   Wt 96.6 kg (213 lb)   SpO2 96%   BMI 30.56 kg/m²   Estimated body mass index is 30.56 kg/m² as calculated from the following:    Height as of this encounter: 177.8 cm (70\").    Weight as of this encounter: 96.6 kg (213 lb).         Physical Exam  Constitutional:       Appearance: Normal appearance. He is well-developed.   HENT:      Head: Normocephalic and atraumatic.   Eyes:      General: No scleral icterus.     Pupils: Pupils are equal, round, and reactive to light.   Cardiovascular:      Rate and Rhythm: Normal rate and regular rhythm.      Heart sounds: Normal " heart sounds. No murmur heard.  Pulmonary:      Breath sounds: Normal breath sounds. No wheezing or rhonchi.   Musculoskeletal:      Right lower leg: No edema.      Left lower leg: No edema.   Skin:     Capillary Refill: Capillary refill takes less than 2 seconds.      Coloration: Skin is not cyanotic.      Nails: There is no clubbing.   Neurological:      Mental Status: He is alert and oriented to person, place, and time.      Motor: No weakness.      Gait: Gait normal.   Psychiatric:         Mood and Affect: Mood normal.         Behavior: Behavior is cooperative.         Thought Content: Thought content normal.         Cognition and Memory: Memory normal.                     Assessment and Plan     ASSESSMENTS AND ORDERS  Diagnoses and all orders for this visit:    1. Paroxysmal atrial fibrillation (Primary)    2. Coronary artery disease involving native coronary artery of native heart, unspecified whether angina present    3. Hypertension, essential    4. Abdominal aortic aneurysm (AAA) without rupture, unspecified part             PLAN  -PAF episode are short and maybe a little better with med changes  -seeing Dr. Paniagua next month  -HTN control, continue current meds  -AAA grown to 4.1, will need to watch annual  -Patient going to [place] in May            Follow Up  Return in about 7 months (around 11/3/2025) for Next scheduled follow up.    AURY Prince MD  Cardiology and [facility]  04/03/2025     Please note that this explicitly excludes time spent on other separate billable services such as performing procedures or test interpretation, when applicable.    This note was created using dictation software which occasionally transcribes nonsensical phrases. Please contact the provider if any clarification is needed.

## 2025-04-29 ENCOUNTER — OFFICE VISIT (OUTPATIENT)
Dept: CARDIOLOGY | Facility: CLINIC | Age: 75
End: 2025-04-29
Payer: MEDICARE

## 2025-04-29 VITALS
OXYGEN SATURATION: 96 % | HEIGHT: 70 IN | DIASTOLIC BLOOD PRESSURE: 70 MMHG | BODY MASS INDEX: 30.26 KG/M2 | HEART RATE: 88 BPM | SYSTOLIC BLOOD PRESSURE: 118 MMHG | WEIGHT: 211.4 LBS

## 2025-04-29 DIAGNOSIS — I49.3 PVC'S (PREMATURE VENTRICULAR CONTRACTIONS): Chronic | ICD-10-CM

## 2025-04-29 DIAGNOSIS — G47.33 OBSTRUCTIVE SLEEP APNEA: ICD-10-CM

## 2025-04-29 DIAGNOSIS — I48.0 PAROXYSMAL ATRIAL FIBRILLATION: Primary | Chronic | ICD-10-CM

## 2025-04-29 DIAGNOSIS — I10 HYPERTENSION, ESSENTIAL: Chronic | ICD-10-CM

## 2025-04-29 PROCEDURE — 3078F DIAST BP <80 MM HG: CPT

## 2025-04-29 PROCEDURE — 3074F SYST BP LT 130 MM HG: CPT

## 2025-04-29 PROCEDURE — 93000 ELECTROCARDIOGRAM COMPLETE: CPT

## 2025-04-29 PROCEDURE — 99214 OFFICE O/P EST MOD 30 MIN: CPT

## 2025-04-29 RX ORDER — DILTIAZEM HCL 60 MG
60 TABLET ORAL EVERY 6 HOURS PRN
Qty: 60 TABLET | Refills: 0 | Status: SHIPPED | OUTPATIENT
Start: 2025-04-29

## 2025-04-29 NOTE — PROGRESS NOTES
Cardiac Electrophysiology Outpatient Note  [facility] at [facility]    Office Visit     Albino Rivera  [id number]  04/29/2025    Primary Care Physician: Linsey Jung MD    Referred By: No ref. provider found    Subjective     Chief Complaint   Patient presents with    Paroxysmal atrial fibrillation     3-Mo F/U     Cardiac PMH: (Old records have been reviewed and summarized below)  Problem list:   PAF  Atrial fibrillation/flutter diagnosed on Holter monitor for 6 days revealing episodes of atrial tachycardia/flutter January 2021  Atrial fibrillation with RVR requiring cardioversion procedure June 2022 outside hospital in [place].  Symptomatic with palpitations  Chads Vascor equal to 3 chronic Eliquis therapy  Sotalol therapy ordered per local cardiologist November 2022   S/p pulmonary vein ablation procedure and successful RFA of left-sided atrial flutter around the mitral valve annulus.  Additional ablation for triggers of atrial for been the anterior wall of the left atrium by Dr. Paniagua on February 13, 2023.  Recurrence 2024 with subsequent initiation of sotalol  14d monitor 11/2024: 44/58/138 bpm, 52.2% bradycardia.  7 episodes of atrial tachycardia 2.6% PVCs  Redo pulmonary vein isolation Dr. Paniagua 1/2025, posterior wall isolation, multiple atrial flutter ablations including mitral isthmus dependent atrial flutter on the anterior wall, CTI dependent atrial flutter and biatrial flutter, ablation of a PAC arising from the anterior left atrium.  Mapping of PVCs however these were too infrequent to allow detailed enough mapping for ablation  Coronary disease  Angina pectoris abnormal stress echo small codominant ardiogram with echo revealing apical septal hypokinesis 2021  Left heart catheterization and LAD stent, 40% circumflex artery stenosis, with noted  of small codominant RCA February 2021 [facility]  Echocardiogram December 30, 2021 EF 45 to 50%  mild LVH, left atrium is mildly dilated moderate AI mild MR  TTE 12/2024: LVEF 44%, grade 1 diastolic dysfunction, moderate AI  COPD  Hyperlipidemia  Abdominal aortic aneurysm: Followed by Dr. Prince  WILSON-CPAP    History of Present Illness:   Albino Rivera is a 74 y.o. male who presents to my electrophysiology clinic for follow up of atrial fibrillation and PVCs.  He underwent extensive ablation outlined in above problem list in January 2025.  He had reported to ER weekend after procedure due to tachycardia with heart rate in the 200s.  Vagal maneuvers were attempted along with adenosine 6 and 12 mg with no change.  He eventually converted to normal sinus rhythm Cardizem and long-acting Cardizem was added.  Since then, he has occasionally had some atrial fibrillation with RVR but he is usually able to rest and it goes away within 30 minutes to an hour.  These episodes have become less and less frequent.  He has palpitations and lightheadedness when out of rhythm.  He denies any chest pain.    Past Medical History:   Diagnosis Date    A-fib     AAA (abdominal aortic aneurysm)     Abnormal ECG     Abnormal echocardiogram     Abnormal heart rhythm     Acid reflux     Aneurysm 2019    Arrhythmia     Arthritis     BILAT HANDS    Athscl heart disease of native coronary artery w/o ang pctrs     BPH (benign prostatic hyperplasia)     Bradycardia, unspecified     CAD (coronary artery disease)     S/P LAD STENT 2/21 AND  RCA    Cataracts, bilateral     COPD (chronic obstructive pulmonary disease)     Dislocated elbow     AGE 16    Double vision     SYMPTON, EPISODES OF DOUBLE VISION- RARE    Easy bruising     Eczema     NECK AND ELBOWS    GERD (gastroesophageal reflux disease)     Hearing loss     more in right eAR- 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    High cholesterol     History of cardioversion 10/2022    3X    History of stress test     Hypertension     Irregular heart beat     Kidney stones     HAD SUYRGERY -X2    Other emphysema      Other hypersomnia     Other long term (current) drug therapy     Paroxysmal atrial fibrillation     Percutaneous transluminal coronary angioplasty (PTCA) within last 14 to 24 months     Precordial pain     Prostate disorder     PROBLEMS    Pure hypercholesterolemia, unspecified     Sleep apnea     CPAP - COMPLIANT WITH MACHINE    Supraventricular tachycardia     Tinnitus     right ear    UTI (urinary tract infection) 06/2024    Vertigo     Wears glasses     Wears glasses        Past Surgical History:   Procedure Laterality Date    ABLATION OF DYSRHYTHMIC FOCUS  02/14/2024    Also 01/31/25    APPENDECTOMY  1959    CARDIAC CATHETERIZATION      CARDIAC ELECTROPHYSIOLOGY PROCEDURE N/A 02/13/2023    Procedure: Ablation atrial fibrillation. Hold Eliquis the morning of the procedure.;  Surgeon: Manjeet Paniagua MD;  Location:  [facility];  Service: Cardiovascular;  Laterality: N/A;    CARDIAC ELECTROPHYSIOLOGY PROCEDURE  01/31/2025    Procedure: Re-do Ablation atrial fibrillation+/- PVC ablation. Hold Sotalol 5 days prior. DNS Eliquis.;  Surgeon: Manjeet Paniagua MD;  Location:  [facility];  Service: Cardiovascular;;    CATARACT EXTRACTION Bilateral     COLONOSCOPY      CORONARY ANGIOPLASTY WITH STENT PLACEMENT      one stent    KIDNEY STONE SURGERY      2019-2020   X2    POLYPECTOMY      WITH COLONOSCOPY    UMBILICAL HERNIA REPAIR  2019    MESH IN PLACE PT THINKS    WISDOM TOOTH EXTRACTION         Family History   Problem Relation Age of Onset    Heart failure Father     Heart attack Father     Heart attack Brother     Prostate cancer Brother     Asthma Brother     Melanoma Brother     Atrial fibrillation Brother     Heart attack Brother        Social History     Socioeconomic History    Marital status:     Number of children: 2   Tobacco Use    Smoking status: Former     Current packs/day: 0.00     Average packs/day: 2.0 packs/day for 17.0 years (34.0 ttl pk-yrs)     Types: Cigarettes      Start date: 1968     Quit date: 1984     Years since quittin.3     Passive exposure: Past    Smokeless tobacco: Never   Vaping Use    Vaping status: Never Used   Substance and Sexual Activity    Alcohol use: Yes     Alcohol/week: 4.0 standard drinks of alcohol     Types: 4 Glasses of wine per week     Comment: a few drink a week    Drug use: Never    Sexual activity: Not Currently     Partners: Female     Birth control/protection: Condom, Pill         Current Outpatient Medications:     acetaminophen (TYLENOL) 500 MG tablet, Take 650 mg by mouth As Needed for Mild Pain., Disp: , Rfl:     albuterol sulfate  (90 Base) MCG/ACT inhaler, Inhale 1 puff Every 4 (Four) Hours As Needed., Disp: , Rfl:     aspirin 81 MG chewable tablet, Chew 1 tablet Daily., Disp: , Rfl:     Breztri Aerosphere 160-9-4.8 MCG/ACT aerosol inhaler, Inhale 2 puffs., Disp: , Rfl:     Cetirizine HCl (ZyrTEC Allergy) 10 MG capsule, Take 10 mg by mouth As Needed., Disp: , Rfl:     dilTIAZem CD (CARDIZEM CD) 240 MG 24 hr capsule, Take 1 capsule by mouth Daily., Disp: 30 capsule, Rfl: 11    Eliquis 5 MG tablet tablet, TAKE 1 TABLET BY MOUTH TWICE DAILY, Disp: 180 tablet, Rfl: 3    loratadine (Claritin) 10 MG tablet, Take 1 tablet by mouth Every Night., Disp: , Rfl:     losartan (COZAAR) 25 MG tablet, Take 1 tablet by mouth Daily., Disp: 30 tablet, Rfl: 11    MAGnesium-Oxide 400 (240 Mg) MG tablet, Take 1 tablet by mouth Daily., Disp: , Rfl:     metoprolol succinate XL (TOPROL-XL) 50 MG 24 hr tablet, Take 1 tablet by mouth Daily., Disp: 30 tablet, Rfl: 11    montelukast (SINGULAIR) 10 MG tablet, Take 1 tablet by mouth Daily., Disp: , Rfl:     Omeprazole Magnesium (PRILOSEC PO), Take 20 mg by mouth. 3 times a week, Disp: , Rfl:     ondansetron (ZOFRAN) 4 MG tablet, , Disp: , Rfl:     rosuvastatin (CRESTOR) 40 MG tablet, Take 1 tablet by mouth Daily., Disp: 90 tablet, Rfl: 3    Spacer/Aero-Holding Chambers (OptiChamber Nolvia) misc,  ", Disp: , Rfl:     tamsulosin (FLOMAX) 0.4 MG capsule 24 hr capsule, Take 1 capsule by mouth every night at bedtime., Disp: , Rfl:     vitamin B-12 (CYANOCOBALAMIN) 1000 MCG tablet, Take 1 tablet by mouth Daily., Disp: , Rfl:     dilTIAZem (CARDIZEM) 60 MG tablet, Take 1 tablet by mouth Every 6 (Six) Hours As Needed (heart rate >120 bpm for >15 minutes)., Disp: 60 tablet, Rfl: 0    Allergies:   Allergies   Allergen Reactions    Ace Inhibitors Cough       Objective   Vital Signs: Blood pressure 118/70, pulse 88, height 177.8 cm (70\"), weight 95.9 kg (211 lb 6.4 oz), SpO2 96%.    PHYSICAL EXAM  General appearance: Awake, alert, cooperative  Head: Normocephalic, without obvious abnormality, atraumatic  Lungs: Clear to ascultation bilaterally  Heart: Regular rate and rhythm, no murmurs, no lower extremity swelling  Skin: Skin color, turgor normal, no rashes or lesions  Neurologic: Grossly normal     Lab Results   Component Value Date    GLUCOSE 134 (H) 02/02/2025    CALCIUM 9.4 02/02/2025     02/02/2025    K 4.1 02/02/2025    CO2 26.0 02/02/2025     02/02/2025    BUN 15 02/02/2025    CREATININE 0.96 02/02/2025    EGFRIFAFRI 63 05/08/2024    EGFRIFNONA >60 01/27/2022    BCR 15.6 02/02/2025    ANIONGAP 10.0 02/02/2025     Lab Results   Component Value Date    WBC 12.44 (H) 02/02/2025    HGB 15.6 02/02/2025    HCT 46.3 02/02/2025    MCV 93.0 02/02/2025     02/02/2025     No results found for: \"INR\", \"PROTIME\"  No results found for: \"TSH\", \"K4WUNSP\", \"Z1RIJQF\", \"THYROIDAB\"       Results for orders placed during the hospital encounter of 12/05/24    Adult Transthoracic Echo Complete W/ Cont if Necessary Per Protocol    Interpretation Summary    Left ventricular systolic function is mildly decreased. Calculated left ventricular EF = 44.3%    Left ventricular diastolic function is consistent with (grade I) impaired relaxation.    There is calcification of the aortic valve mainly affecting the right coronary " cusp(s).    Moderate aortic valve regurgitation is present.         I personally viewed and interpreted the patient's EKG/Telemetry/lab data      ECG 12 Lead    Date/Time: 4/29/2025 1:45 PM  Performed by: Arnulfo Jeronimo PA-C    Authorized by: Arnulfo Jeronimo PA-C  Comparison: compared with previous ECG from 3/13/2025  Similar to previous ECG  Comparison to previous ECG: VT interval has increased  Rhythm: sinus rhythm  Conduction: 1st degree AV block  Other findings: left ventricular hypertrophy    Clinical impression: abnormal EKG          Albino Rivera  reports that he quit smoking about 41 years ago. His smoking use included cigarettes. He started smoking about 57 years ago. He has a 34 pack-year smoking history. He has been exposed to tobacco smoke. He has never used smokeless tobacco.        Advance Care Planning   Advance Care Planning: ACP discussion was declined by the patient. Patient has an advance directive (not in EMR), copy requested.     Assessment & Plan    1. Paroxysmal atrial fibrillation  S/p redo PVI with posterior wall isolation, multiple atrial flutter ablations including mitral isthmus dependent atrial flutter on the anterior wall, CTI dependent atrial flutter and biatrial flutter, ablation of a PAC arising from the anterior left atrium.  He initially was having problematic atrial fibrillation in the weeks after the ablation.  These are becoming less and less frequent.  He has been without atrial fibrillation for 5 or 6 days now.  Hopefully now that we are 3 months post ablation we will have minimal recurrence.  He was accidentally taking more diltiazem than prescribed for a couple days and he said he felt better during these days.  I recommended increasing diltiazem but he has back-to-back [place] does not want make any big changes right now.  Will give diltiazem 60 as needed for persistent symptomatic tachycardia    Will need to watch closely as he has developed first-degree AV block on  these higher doses of AV node blocking agents.      Continue Eliquis 5 mg twice daily for stroke prophylaxis    2. PVC's (premature ventricular contractions)  Attempted to map during EP study in January but PVC was not frequent enough to map well enough for ablation.  Patient appears to be asymptomatic with these.    3. Hypertension, essential  BP controlled in office today.  Continue current antihypertensive regimen    4. Obstructive sleep apnea  Reports compliance       Follow Up: Patient lives in [place] 6 minutes at a year.  He has a cardiologist there.  We will move his 1 year follow-up up to November as he canceled his 6-month follow-up relative to his ablation      Thank you for allowing me to participate in the care of your patient. Please do not hesitate to contact me with additional questions or concerns.      Arnulfo Jeronimo PA-C  Cardiac Electrophysiology  [facility] / [facility]

## 2025-06-18 DIAGNOSIS — I25.10 CORONARY ARTERY DISEASE INVOLVING NATIVE CORONARY ARTERY OF NATIVE HEART, UNSPECIFIED WHETHER ANGINA PRESENT: ICD-10-CM

## 2025-06-18 RX ORDER — ROSUVASTATIN CALCIUM 40 MG/1
40 TABLET, COATED ORAL DAILY
Qty: 90 TABLET | Refills: 3 | Status: SHIPPED | OUTPATIENT
Start: 2025-06-18

## (undated) DEVICE — RADIFOCUS GLIDEWIRE: Brand: GLIDEWIRE

## (undated) DEVICE — Device: Brand: VIZIGO

## (undated) DEVICE — ADULT, W/LG. BACK PAD, RADIOTRANSPARENT ELEMENT AND LEAD WIRE COMPATIBLE W/: Brand: DEFIBRILLATION ELECTRODES

## (undated) DEVICE — ST EXT IV SMRTSTE 2VLV FIX M LL 6ML 41

## (undated) DEVICE — ELECTRD RETRN/GRND MEGADYNE SGL/PLT W/CORD 9FT DISP

## (undated) DEVICE — Device: Brand: SOUNDSTAR

## (undated) DEVICE — MAPPING CATHETER WITH TRUEREF TECHNOLOGY: Brand: OPTRELL

## (undated) DEVICE — KT DISP ARRHYTHMIA VMAP 91200008 DISP

## (undated) DEVICE — SET PRIMARY GRVTY 10DP MALE LL 104IN

## (undated) DEVICE — ACUMEN IQ CUFF ADULT: Brand: ACUMEN IQ CUFF ADULT

## (undated) DEVICE — STERILE (15.2 TAPERED TO 7.6 X 183CM) POLYETHYLENE ACCORDION-FOLDED COVER FOR USE WITH SIEMENS ACUNAV ULTRASOUND CATHETER FAMILY CONNECTOR: Brand: SWIFTLINK TRANSDUCER COVER

## (undated) DEVICE — Device: Brand: WEBSTER CS

## (undated) DEVICE — PAD GRND REM POLYHESIVE A/ DISP

## (undated) DEVICE — TBG PRESS MON 48IN M/F L/L: Brand: MEDLINE INDUSTRIES, INC.

## (undated) DEVICE — ST EXT IV SMARTSITE PINCH/CLMP 5ML 46CM

## (undated) DEVICE — DRSNG SURESITE123 4X4.8IN

## (undated) DEVICE — Device: Brand: SMARTABLATE

## (undated) DEVICE — SYS CLS VASC/VENI VASCADE MVP 6TO12F

## (undated) DEVICE — INTRO SHEATH ENGAGE W/50 GW .038 8F12

## (undated) DEVICE — INTRO SHEATH ENGAGE W/50 GW .038 7F12

## (undated) DEVICE — ST EXT IV LG BORE NDLESS FLTR LL 17IN

## (undated) DEVICE — PRESSURE MONITORING SET: Brand: TRUWAVE

## (undated) DEVICE — Device: Brand: MEDEX

## (undated) DEVICE — BI-DIRECTIONAL NAVIGATION CATHETER, NAV, D-F: Brand: QDOT MICRO

## (undated) DEVICE — SOL NACL 0.9PCT 1000ML

## (undated) DEVICE — ST INF PRI SMRTSTE 20DRP 2VLV 24ML 117

## (undated) DEVICE — NDL PERC 1PART ECHOTIP WO/BASEPLT 18G 7CM

## (undated) DEVICE — Device: Brand: REFERENCE PATCH CARTO 3

## (undated) DEVICE — Device: Brand: THERMOCOOL SMARTTOUCH SF

## (undated) DEVICE — PINNACLE INTRODUCER SHEATH: Brand: PINNACLE

## (undated) DEVICE — DEV CLS VASC MYNXCONTROL 6FTO7F

## (undated) DEVICE — 1 X VERSACROSS TRANSSEPTAL SHEATH (INCLUDING  1 X J-TIP GUIDEWIRE); 1 X VERSACROSS RF WIRE (INCLUDING 1 X CONNECTOR CABLE (SINGLE USE)); 1 X DISPERSIVE ELECTRODE: Brand: VERSACROSS ACCESS SOLUTION

## (undated) DEVICE — LEX ELECTRO PHYSIOLOGY: Brand: MEDLINE INDUSTRIES, INC.

## (undated) DEVICE — TBG SXN ESOPH ENSOETM FOR/BLANETROL/1/2

## (undated) DEVICE — DECANT BG O JET

## (undated) DEVICE — INTRO SHEATH FAST/CATH LG/LUM 11F .038IN 12CM

## (undated) DEVICE — INTRO SHEATH ENGAGE W/50 GW .038 9F12

## (undated) DEVICE — OCTA,GALAXY,3-3-3-3-3,D-CURVE: Brand: OCTARAY MAPPING CATHETER